# Patient Record
Sex: FEMALE | Race: BLACK OR AFRICAN AMERICAN | NOT HISPANIC OR LATINO
[De-identification: names, ages, dates, MRNs, and addresses within clinical notes are randomized per-mention and may not be internally consistent; named-entity substitution may affect disease eponyms.]

---

## 2017-05-02 ENCOUNTER — OTHER (OUTPATIENT)
Age: 72
End: 2017-05-02

## 2017-08-14 ENCOUNTER — RX RENEWAL (OUTPATIENT)
Age: 72
End: 2017-08-14

## 2017-08-23 ENCOUNTER — OTHER (OUTPATIENT)
Age: 72
End: 2017-08-23

## 2017-09-13 ENCOUNTER — RX RENEWAL (OUTPATIENT)
Age: 72
End: 2017-09-13

## 2017-09-13 ENCOUNTER — LABORATORY RESULT (OUTPATIENT)
Age: 72
End: 2017-09-13

## 2017-09-13 ENCOUNTER — APPOINTMENT (OUTPATIENT)
Dept: INTERNAL MEDICINE | Facility: CLINIC | Age: 72
End: 2017-09-13
Payer: MEDICARE

## 2017-09-13 VITALS
WEIGHT: 212 LBS | TEMPERATURE: 98.2 F | HEIGHT: 66 IN | RESPIRATION RATE: 14 BRPM | HEART RATE: 86 BPM | SYSTOLIC BLOOD PRESSURE: 194 MMHG | OXYGEN SATURATION: 98 % | DIASTOLIC BLOOD PRESSURE: 91 MMHG | BODY MASS INDEX: 34.07 KG/M2

## 2017-09-13 DIAGNOSIS — R94.8 ABNORMAL RESULTS OF FUNCTION STUDIES OF OTHER ORGANS AND SYSTEMS: ICD-10-CM

## 2017-09-13 DIAGNOSIS — M79.609 PAIN IN UNSPECIFIED LIMB: ICD-10-CM

## 2017-09-13 PROCEDURE — G0439: CPT

## 2017-09-13 PROCEDURE — 99214 OFFICE O/P EST MOD 30 MIN: CPT | Mod: 25

## 2017-09-13 PROCEDURE — 36415 COLL VENOUS BLD VENIPUNCTURE: CPT

## 2017-09-19 LAB
25(OH)D3 SERPL-MCNC: 48.9 NG/ML
ALBUMIN SERPL ELPH-MCNC: 4.2 G/DL
ALP BLD-CCNC: 67 U/L
ALT SERPL-CCNC: 42 U/L
ANION GAP SERPL CALC-SCNC: 14 MMOL/L
APPEARANCE: CLEAR
AST SERPL-CCNC: 35 U/L
BASOPHILS # BLD AUTO: 0.02 K/UL
BASOPHILS NFR BLD AUTO: 0.3 %
BILIRUB SERPL-MCNC: 0.3 MG/DL
BILIRUBIN URINE: NEGATIVE
BLOOD URINE: ABNORMAL
BUN SERPL-MCNC: 24 MG/DL
CALCIUM SERPL-MCNC: 9.5 MG/DL
CHLORIDE SERPL-SCNC: 101 MMOL/L
CHOLEST SERPL-MCNC: 207 MG/DL
CHOLEST/HDLC SERPL: 2.6 RATIO
CO2 SERPL-SCNC: 25 MMOL/L
COLOR: YELLOW
CREAT SERPL-MCNC: 0.69 MG/DL
EOSINOPHIL # BLD AUTO: 0.08 K/UL
EOSINOPHIL NFR BLD AUTO: 1.1 %
GLUCOSE QUALITATIVE U: NORMAL MG/DL
GLUCOSE SERPL-MCNC: 128 MG/DL
HBA1C MFR BLD HPLC: 5.8 %
HCT VFR BLD CALC: 47.4 %
HDLC SERPL-MCNC: 79 MG/DL
HGB BLD-MCNC: 15 G/DL
IMM GRANULOCYTES NFR BLD AUTO: 0.3 %
KETONES URINE: NEGATIVE
LDLC SERPL CALC-MCNC: 115 MG/DL
LEUKOCYTE ESTERASE URINE: NEGATIVE
LYMPHOCYTES # BLD AUTO: 1.85 K/UL
LYMPHOCYTES NFR BLD AUTO: 24.6 %
MAN DIFF?: NORMAL
MCHC RBC-ENTMCNC: 30.4 PG
MCHC RBC-ENTMCNC: 31.6 GM/DL
MCV RBC AUTO: 96 FL
MONOCYTES # BLD AUTO: 0.44 K/UL
MONOCYTES NFR BLD AUTO: 5.9 %
NEUTROPHILS # BLD AUTO: 5.11 K/UL
NEUTROPHILS NFR BLD AUTO: 67.8 %
NITRITE URINE: NEGATIVE
PH URINE: 6.5
PLATELET # BLD AUTO: NORMAL
POTASSIUM SERPL-SCNC: 4.1 MMOL/L
PROT SERPL-MCNC: 7.8 G/DL
PROTEIN URINE: ABNORMAL MG/DL
RBC # BLD: 4.94 M/UL
RBC # FLD: 15.4 %
SODIUM SERPL-SCNC: 140 MMOL/L
SPECIFIC GRAVITY URINE: 1.02
TRIGL SERPL-MCNC: 64 MG/DL
TSH SERPL-ACNC: 1.07 UIU/ML
UROBILINOGEN URINE: NORMAL MG/DL
VIT B12 SERPL-MCNC: >2000 PG/ML
WBC # FLD AUTO: 7.52 K/UL

## 2017-10-09 ENCOUNTER — APPOINTMENT (OUTPATIENT)
Dept: INTERNAL MEDICINE | Facility: CLINIC | Age: 72
End: 2017-10-09

## 2017-10-17 ENCOUNTER — OTHER (OUTPATIENT)
Age: 72
End: 2017-10-17

## 2017-10-25 ENCOUNTER — RESULT CHARGE (OUTPATIENT)
Age: 72
End: 2017-10-25

## 2017-10-26 ENCOUNTER — APPOINTMENT (OUTPATIENT)
Dept: HEART AND VASCULAR | Facility: CLINIC | Age: 72
End: 2017-10-26
Payer: MEDICARE

## 2017-10-26 VITALS
OXYGEN SATURATION: 99 % | HEART RATE: 97 BPM | DIASTOLIC BLOOD PRESSURE: 110 MMHG | WEIGHT: 213 LBS | BODY MASS INDEX: 34.23 KG/M2 | TEMPERATURE: 99.2 F | SYSTOLIC BLOOD PRESSURE: 178 MMHG | HEIGHT: 66 IN

## 2017-10-26 DIAGNOSIS — R60.0 LOCALIZED EDEMA: ICD-10-CM

## 2017-10-26 DIAGNOSIS — E78.5 HYPERLIPIDEMIA, UNSPECIFIED: ICD-10-CM

## 2017-10-26 DIAGNOSIS — R80.9 PROTEINURIA, UNSPECIFIED: ICD-10-CM

## 2017-10-26 DIAGNOSIS — Z78.9 OTHER SPECIFIED HEALTH STATUS: ICD-10-CM

## 2017-10-26 PROCEDURE — 99204 OFFICE O/P NEW MOD 45 MIN: CPT | Mod: 25

## 2017-10-26 PROCEDURE — 93000 ELECTROCARDIOGRAM COMPLETE: CPT

## 2017-10-26 RX ORDER — NAPROXEN 250 MG/1
250 TABLET ORAL
Qty: 14 | Refills: 0 | Status: DISCONTINUED | COMMUNITY
Start: 2017-09-13 | End: 2017-10-26

## 2017-11-30 ENCOUNTER — OTHER (OUTPATIENT)
Age: 72
End: 2017-11-30

## 2018-01-16 ENCOUNTER — OTHER (OUTPATIENT)
Age: 73
End: 2018-01-16

## 2018-01-16 ENCOUNTER — RX RENEWAL (OUTPATIENT)
Age: 73
End: 2018-01-16

## 2018-04-19 ENCOUNTER — RX RENEWAL (OUTPATIENT)
Age: 73
End: 2018-04-19

## 2018-07-05 ENCOUNTER — APPOINTMENT (OUTPATIENT)
Dept: ENDOCRINOLOGY | Facility: CLINIC | Age: 73
End: 2018-07-05
Payer: MEDICARE

## 2018-07-05 VITALS
SYSTOLIC BLOOD PRESSURE: 187 MMHG | HEART RATE: 99 BPM | WEIGHT: 220 LBS | DIASTOLIC BLOOD PRESSURE: 93 MMHG | HEIGHT: 66 IN | BODY MASS INDEX: 35.36 KG/M2

## 2018-07-05 DIAGNOSIS — L67.8 OTHER HAIR COLOR AND HAIR SHAFT ABNORMALITIES: ICD-10-CM

## 2018-07-05 DIAGNOSIS — E66.01 MORBID (SEVERE) OBESITY DUE TO EXCESS CALORIES: ICD-10-CM

## 2018-07-05 PROCEDURE — 99204 OFFICE O/P NEW MOD 45 MIN: CPT

## 2018-07-18 ENCOUNTER — OTHER (OUTPATIENT)
Age: 73
End: 2018-07-18

## 2018-07-24 ENCOUNTER — OTHER (OUTPATIENT)
Age: 73
End: 2018-07-24

## 2018-08-02 ENCOUNTER — APPOINTMENT (OUTPATIENT)
Dept: GASTROENTEROLOGY | Facility: CLINIC | Age: 73
End: 2018-08-02
Payer: MEDICARE

## 2018-08-02 VITALS
SYSTOLIC BLOOD PRESSURE: 180 MMHG | RESPIRATION RATE: 16 BRPM | HEART RATE: 97 BPM | DIASTOLIC BLOOD PRESSURE: 98 MMHG | OXYGEN SATURATION: 97 %

## 2018-08-02 DIAGNOSIS — Z86.19 PERSONAL HISTORY OF OTHER INFECTIOUS AND PARASITIC DISEASES: ICD-10-CM

## 2018-08-02 DIAGNOSIS — R10.9 UNSPECIFIED ABDOMINAL PAIN: ICD-10-CM

## 2018-08-02 DIAGNOSIS — Z80.0 FAMILY HISTORY OF MALIGNANT NEOPLASM OF DIGESTIVE ORGANS: ICD-10-CM

## 2018-08-02 PROCEDURE — 99205 OFFICE O/P NEW HI 60 MIN: CPT | Mod: 25

## 2018-08-02 PROCEDURE — 36415 COLL VENOUS BLD VENIPUNCTURE: CPT

## 2018-08-06 LAB — UREA BREATH TEST QL: NEGATIVE

## 2018-08-14 ENCOUNTER — OUTPATIENT (OUTPATIENT)
Dept: OUTPATIENT SERVICES | Facility: HOSPITAL | Age: 73
LOS: 1 days | End: 2018-08-14
Payer: MEDICARE

## 2018-08-14 ENCOUNTER — APPOINTMENT (OUTPATIENT)
Dept: CT IMAGING | Facility: HOSPITAL | Age: 73
End: 2018-08-14

## 2018-08-14 PROCEDURE — 74177 CT ABD & PELVIS W/CONTRAST: CPT | Mod: 26

## 2018-08-14 PROCEDURE — 74177 CT ABD & PELVIS W/CONTRAST: CPT

## 2018-08-16 LAB
ALBUMIN SERPL ELPH-MCNC: 4.4 G/DL
ALP BLD-CCNC: 59 U/L
ALT SERPL-CCNC: 27 U/L
ANION GAP SERPL CALC-SCNC: 15 MMOL/L
AST SERPL-CCNC: 29 U/L
BASOPHILS # BLD AUTO: 0.02 K/UL
BASOPHILS NFR BLD AUTO: 0.3 %
BILIRUB SERPL-MCNC: 0.2 MG/DL
BUN SERPL-MCNC: 24 MG/DL
CALCIUM SERPL-MCNC: 9.7 MG/DL
CHLORIDE SERPL-SCNC: 101 MMOL/L
CO2 SERPL-SCNC: 25 MMOL/L
CREAT SERPL-MCNC: 0.7 MG/DL
EOSINOPHIL # BLD AUTO: 0.06 K/UL
EOSINOPHIL NFR BLD AUTO: 0.8 %
GLUCOSE SERPL-MCNC: 134 MG/DL
HCT VFR BLD CALC: 45.2 %
HGB BLD-MCNC: 14.3 G/DL
IMM GRANULOCYTES NFR BLD AUTO: 0.3 %
LYMPHOCYTES # BLD AUTO: 1.73 K/UL
LYMPHOCYTES NFR BLD AUTO: 23.4 %
MAN DIFF?: NORMAL
MCHC RBC-ENTMCNC: 29.9 PG
MCHC RBC-ENTMCNC: 31.6 GM/DL
MCV RBC AUTO: 94.4 FL
MONOCYTES # BLD AUTO: 0.48 K/UL
MONOCYTES NFR BLD AUTO: 6.5 %
NEUTROPHILS # BLD AUTO: 5.09 K/UL
NEUTROPHILS NFR BLD AUTO: 68.7 %
PLATELET # BLD AUTO: 213 K/UL
POTASSIUM SERPL-SCNC: 4 MMOL/L
PREALB SERPL NEPH-MCNC: 27 MG/DL
PROT SERPL-MCNC: 8.4 G/DL
RBC # BLD: 4.79 M/UL
RBC # FLD: 14.6 %
SODIUM SERPL-SCNC: 141 MMOL/L
WBC # FLD AUTO: 7.4 K/UL

## 2018-08-24 ENCOUNTER — CHART COPY (OUTPATIENT)
Age: 73
End: 2018-08-24

## 2018-08-28 ENCOUNTER — APPOINTMENT (OUTPATIENT)
Dept: INTERNAL MEDICINE | Facility: CLINIC | Age: 73
End: 2018-08-28
Payer: MEDICARE

## 2018-08-28 VITALS — SYSTOLIC BLOOD PRESSURE: 168 MMHG | DIASTOLIC BLOOD PRESSURE: 82 MMHG

## 2018-08-28 VITALS
OXYGEN SATURATION: 96 % | BODY MASS INDEX: 36.32 KG/M2 | DIASTOLIC BLOOD PRESSURE: 82 MMHG | SYSTOLIC BLOOD PRESSURE: 182 MMHG | TEMPERATURE: 99.5 F | HEIGHT: 66 IN | HEART RATE: 85 BPM | WEIGHT: 226 LBS

## 2018-08-28 DIAGNOSIS — R14.0 ABDOMINAL DISTENSION (GASEOUS): ICD-10-CM

## 2018-08-28 DIAGNOSIS — M79.89 OTHER SPECIFIED SOFT TISSUE DISORDERS: ICD-10-CM

## 2018-08-28 PROCEDURE — 99214 OFFICE O/P EST MOD 30 MIN: CPT

## 2018-09-12 ENCOUNTER — FORM ENCOUNTER (OUTPATIENT)
Age: 73
End: 2018-09-12

## 2018-09-13 ENCOUNTER — OUTPATIENT (OUTPATIENT)
Dept: OUTPATIENT SERVICES | Facility: HOSPITAL | Age: 73
LOS: 1 days | End: 2018-09-13
Payer: MEDICARE

## 2018-09-13 DIAGNOSIS — R07.9 CHEST PAIN, UNSPECIFIED: ICD-10-CM

## 2018-09-13 PROCEDURE — 93306 TTE W/DOPPLER COMPLETE: CPT

## 2018-09-13 PROCEDURE — 93306 TTE W/DOPPLER COMPLETE: CPT | Mod: 26

## 2018-09-19 ENCOUNTER — LABORATORY RESULT (OUTPATIENT)
Age: 73
End: 2018-09-19

## 2018-09-19 ENCOUNTER — APPOINTMENT (OUTPATIENT)
Dept: INTERNAL MEDICINE | Facility: CLINIC | Age: 73
End: 2018-09-19
Payer: MEDICARE

## 2018-09-19 ENCOUNTER — RX RENEWAL (OUTPATIENT)
Age: 73
End: 2018-09-19

## 2018-09-19 VITALS
TEMPERATURE: 98.1 F | HEART RATE: 81 BPM | OXYGEN SATURATION: 97 % | BODY MASS INDEX: 36.64 KG/M2 | DIASTOLIC BLOOD PRESSURE: 90 MMHG | HEIGHT: 66 IN | WEIGHT: 228 LBS | SYSTOLIC BLOOD PRESSURE: 170 MMHG

## 2018-09-19 VITALS — DIASTOLIC BLOOD PRESSURE: 88 MMHG | SYSTOLIC BLOOD PRESSURE: 150 MMHG

## 2018-09-19 DIAGNOSIS — I51.9 HEART DISEASE, UNSPECIFIED: ICD-10-CM

## 2018-09-19 DIAGNOSIS — Z00.00 ENCOUNTER FOR GENERAL ADULT MEDICAL EXAMINATION W/OUT ABNORMAL FINDINGS: ICD-10-CM

## 2018-09-19 PROCEDURE — G0439: CPT

## 2018-09-19 PROCEDURE — 36415 COLL VENOUS BLD VENIPUNCTURE: CPT

## 2018-09-25 ENCOUNTER — OTHER (OUTPATIENT)
Age: 73
End: 2018-09-25

## 2018-09-25 LAB
25(OH)D3 SERPL-MCNC: 47.3 NG/ML
ALBUMIN SERPL ELPH-MCNC: 4.4 G/DL
ALP BLD-CCNC: 62 U/L
ALT SERPL-CCNC: 36 U/L
ANION GAP SERPL CALC-SCNC: 13 MMOL/L
APPEARANCE: ABNORMAL
AST SERPL-CCNC: 27 U/L
BASOPHILS # BLD AUTO: 0.03 K/UL
BASOPHILS NFR BLD AUTO: 0.5 %
BILIRUB SERPL-MCNC: 0.2 MG/DL
BILIRUBIN URINE: NEGATIVE
BLOOD URINE: ABNORMAL
BUN SERPL-MCNC: 20 MG/DL
CALCIUM SERPL-MCNC: 9.3 MG/DL
CHLORIDE SERPL-SCNC: 101 MMOL/L
CHOLEST SERPL-MCNC: 183 MG/DL
CHOLEST/HDLC SERPL: 2.8 RATIO
CO2 SERPL-SCNC: 25 MMOL/L
COLOR: YELLOW
CREAT SERPL-MCNC: 0.56 MG/DL
EOSINOPHIL # BLD AUTO: 0.09 K/UL
EOSINOPHIL NFR BLD AUTO: 1.4 %
GLUCOSE QUALITATIVE U: NEGATIVE MG/DL
GLUCOSE SERPL-MCNC: 126 MG/DL
HBA1C MFR BLD HPLC: 6.2 %
HCT VFR BLD CALC: 43.3 %
HDLC SERPL-MCNC: 66 MG/DL
HGB BLD-MCNC: 13.4 G/DL
IMM GRANULOCYTES NFR BLD AUTO: 0.3 %
KETONES URINE: NEGATIVE
LDLC SERPL CALC-MCNC: 107 MG/DL
LEUKOCYTE ESTERASE URINE: ABNORMAL
LYMPHOCYTES # BLD AUTO: 1.8 K/UL
LYMPHOCYTES NFR BLD AUTO: 27.6 %
MAN DIFF?: NORMAL
MCHC RBC-ENTMCNC: 29.5 PG
MCHC RBC-ENTMCNC: 30.9 GM/DL
MCV RBC AUTO: 95.2 FL
MONOCYTES # BLD AUTO: 0.47 K/UL
MONOCYTES NFR BLD AUTO: 7.2 %
NEUTROPHILS # BLD AUTO: 4.12 K/UL
NEUTROPHILS NFR BLD AUTO: 63 %
NITRITE URINE: POSITIVE
PH URINE: 6.5
PLATELET # BLD AUTO: 220 K/UL
POTASSIUM SERPL-SCNC: 4.4 MMOL/L
PROT SERPL-MCNC: 7.7 G/DL
PROTEIN URINE: ABNORMAL MG/DL
RBC # BLD: 4.55 M/UL
RBC # FLD: 16.3 %
SODIUM SERPL-SCNC: 139 MMOL/L
SPECIFIC GRAVITY URINE: 1.02
TRIGL SERPL-MCNC: 49 MG/DL
TSH SERPL-ACNC: 0.81 UIU/ML
UROBILINOGEN URINE: NEGATIVE MG/DL
VIT B12 SERPL-MCNC: >2000 PG/ML
WBC # FLD AUTO: 6.53 K/UL

## 2018-09-25 RX ORDER — NITROFURANTOIN MACROCRYSTALS 100 MG/1
100 CAPSULE ORAL
Qty: 14 | Refills: 0 | Status: ACTIVE | COMMUNITY
Start: 2018-09-25 | End: 1900-01-01

## 2018-11-29 ENCOUNTER — APPOINTMENT (OUTPATIENT)
Dept: HEART AND VASCULAR | Facility: CLINIC | Age: 73
End: 2018-11-29

## 2019-01-18 ENCOUNTER — OTHER (OUTPATIENT)
Age: 74
End: 2019-01-18

## 2019-02-08 ENCOUNTER — INPATIENT (INPATIENT)
Facility: HOSPITAL | Age: 74
LOS: 3 days | Discharge: NURSING, REHAB OR LONG-TERM CARE | DRG: 871 | End: 2019-02-12
Attending: INTERNAL MEDICINE | Admitting: INTERNAL MEDICINE
Payer: MEDICARE

## 2019-02-08 VITALS
DIASTOLIC BLOOD PRESSURE: 80 MMHG | OXYGEN SATURATION: 97 % | RESPIRATION RATE: 21 BRPM | HEART RATE: 86 BPM | TEMPERATURE: 99 F | SYSTOLIC BLOOD PRESSURE: 159 MMHG

## 2019-02-08 DIAGNOSIS — I10 ESSENTIAL (PRIMARY) HYPERTENSION: ICD-10-CM

## 2019-02-08 DIAGNOSIS — N39.0 URINARY TRACT INFECTION, SITE NOT SPECIFIED: ICD-10-CM

## 2019-02-08 DIAGNOSIS — R06.03 ACUTE RESPIRATORY DISTRESS: ICD-10-CM

## 2019-02-08 DIAGNOSIS — A41.9 SEPSIS, UNSPECIFIED ORGANISM: ICD-10-CM

## 2019-02-08 DIAGNOSIS — Z91.89 OTHER SPECIFIED PERSONAL RISK FACTORS, NOT ELSEWHERE CLASSIFIED: ICD-10-CM

## 2019-02-08 DIAGNOSIS — R29.898 OTHER SYMPTOMS AND SIGNS INVOLVING THE MUSCULOSKELETAL SYSTEM: ICD-10-CM

## 2019-02-08 DIAGNOSIS — R73.9 HYPERGLYCEMIA, UNSPECIFIED: ICD-10-CM

## 2019-02-08 DIAGNOSIS — E87.6 HYPOKALEMIA: ICD-10-CM

## 2019-02-08 LAB
ANION GAP SERPL CALC-SCNC: 14 MMOL/L — SIGNIFICANT CHANGE UP (ref 5–17)
ANION GAP SERPL CALC-SCNC: 16 MMOL/L — SIGNIFICANT CHANGE UP (ref 5–17)
APPEARANCE UR: ABNORMAL
BACTERIA # UR AUTO: ABNORMAL /HPF
BASOPHILS # BLD AUTO: 0 K/UL — SIGNIFICANT CHANGE UP (ref 0–0.2)
BASOPHILS NFR BLD AUTO: 0 % — SIGNIFICANT CHANGE UP (ref 0–2)
BILIRUB UR-MCNC: NEGATIVE — SIGNIFICANT CHANGE UP
BUN SERPL-MCNC: 56 MG/DL — HIGH (ref 7–23)
BUN SERPL-MCNC: 59 MG/DL — HIGH (ref 7–23)
CALCIUM SERPL-MCNC: 8.3 MG/DL — LOW (ref 8.4–10.5)
CALCIUM SERPL-MCNC: 8.4 MG/DL — SIGNIFICANT CHANGE UP (ref 8.4–10.5)
CHLORIDE SERPL-SCNC: 96 MMOL/L — SIGNIFICANT CHANGE UP (ref 96–108)
CHLORIDE SERPL-SCNC: 96 MMOL/L — SIGNIFICANT CHANGE UP (ref 96–108)
CK MB CFR SERPL CALC: 2.6 NG/ML — SIGNIFICANT CHANGE UP (ref 0–6.7)
CK SERPL-CCNC: 230 U/L — HIGH (ref 25–170)
CK SERPL-CCNC: 246 U/L — HIGH (ref 25–170)
CO2 SERPL-SCNC: 24 MMOL/L — SIGNIFICANT CHANGE UP (ref 22–31)
CO2 SERPL-SCNC: 24 MMOL/L — SIGNIFICANT CHANGE UP (ref 22–31)
COLOR SPEC: YELLOW — SIGNIFICANT CHANGE UP
CREAT SERPL-MCNC: 1.02 MG/DL — SIGNIFICANT CHANGE UP (ref 0.5–1.3)
CREAT SERPL-MCNC: 1.05 MG/DL — SIGNIFICANT CHANGE UP (ref 0.5–1.3)
CRP SERPL-MCNC: 34 MG/DL — HIGH (ref 0–0.4)
DIFF PNL FLD: ABNORMAL
EOSINOPHIL # BLD AUTO: 0 K/UL — SIGNIFICANT CHANGE UP (ref 0–0.5)
EOSINOPHIL NFR BLD AUTO: 0 % — SIGNIFICANT CHANGE UP (ref 0–6)
EPI CELLS # UR: SIGNIFICANT CHANGE UP /HPF (ref 0–5)
ERYTHROCYTE [SEDIMENTATION RATE] IN BLOOD: 93 MM/HR — HIGH
GAS PNL BLDA: SIGNIFICANT CHANGE UP
GAS PNL BLDV: SIGNIFICANT CHANGE UP
GLUCOSE BLDC GLUCOMTR-MCNC: 321 MG/DL — HIGH (ref 70–99)
GLUCOSE SERPL-MCNC: 291 MG/DL — HIGH (ref 70–99)
GLUCOSE SERPL-MCNC: 345 MG/DL — HIGH (ref 70–99)
GLUCOSE UR QL: NEGATIVE — SIGNIFICANT CHANGE UP
HCT VFR BLD CALC: 35.1 % — SIGNIFICANT CHANGE UP (ref 34.5–45)
HGB BLD-MCNC: 12.1 G/DL — SIGNIFICANT CHANGE UP (ref 11.5–15.5)
KETONES UR-MCNC: NEGATIVE — SIGNIFICANT CHANGE UP
LACTATE SERPL-SCNC: 2.1 MMOL/L — HIGH (ref 0.5–2)
LACTATE SERPL-SCNC: 2.5 MMOL/L — HIGH (ref 0.5–2)
LEUKOCYTE ESTERASE UR-ACNC: ABNORMAL
LG PLATELETS BLD QL AUTO: SLIGHT — SIGNIFICANT CHANGE UP
LYMPHOCYTES # BLD AUTO: 2.45 K/UL — SIGNIFICANT CHANGE UP (ref 1–3.3)
LYMPHOCYTES # BLD AUTO: 7 % — LOW (ref 13–44)
MCHC RBC-ENTMCNC: 29.6 PG — SIGNIFICANT CHANGE UP (ref 27–34)
MCHC RBC-ENTMCNC: 34.5 GM/DL — SIGNIFICANT CHANGE UP (ref 32–36)
MCV RBC AUTO: 85.8 FL — SIGNIFICANT CHANGE UP (ref 80–100)
MONOCYTES # BLD AUTO: 0.7 K/UL — SIGNIFICANT CHANGE UP (ref 0–0.9)
MONOCYTES NFR BLD AUTO: 2 % — SIGNIFICANT CHANGE UP (ref 2–14)
NEUTROPHILS # BLD AUTO: 31.81 K/UL — HIGH (ref 1.8–7.4)
NEUTROPHILS NFR BLD AUTO: 58 % — SIGNIFICANT CHANGE UP (ref 43–77)
NEUTS BAND # BLD: 33 % — HIGH (ref 0–8)
NITRITE UR-MCNC: POSITIVE
NRBC # BLD: 0 /100 — SIGNIFICANT CHANGE UP (ref 0–0)
NRBC # BLD: SIGNIFICANT CHANGE UP /100 WBCS (ref 0–0)
NT-PROBNP SERPL-SCNC: 2658 PG/ML — HIGH (ref 0–300)
PH UR: 6 — SIGNIFICANT CHANGE UP (ref 5–8)
PLAT MORPH BLD: ABNORMAL
PLATELET # BLD AUTO: 112 K/UL — LOW (ref 150–400)
POLYCHROMASIA BLD QL SMEAR: SLIGHT — SIGNIFICANT CHANGE UP
POTASSIUM SERPL-MCNC: 2.7 MMOL/L — CRITICAL LOW (ref 3.5–5.3)
POTASSIUM SERPL-MCNC: 2.9 MMOL/L — CRITICAL LOW (ref 3.5–5.3)
POTASSIUM SERPL-SCNC: 2.7 MMOL/L — CRITICAL LOW (ref 3.5–5.3)
POTASSIUM SERPL-SCNC: 2.9 MMOL/L — CRITICAL LOW (ref 3.5–5.3)
PROT UR-MCNC: 30 MG/DL
RAPID RVP RESULT: SIGNIFICANT CHANGE UP
RBC # BLD: 4.09 M/UL — SIGNIFICANT CHANGE UP (ref 3.8–5.2)
RBC # FLD: 17 % — HIGH (ref 10.3–14.5)
RBC BLD AUTO: ABNORMAL
RBC CASTS # UR COMP ASSIST: < 5 /HPF — SIGNIFICANT CHANGE UP
SCHISTOCYTES BLD QL AUTO: SLIGHT — SIGNIFICANT CHANGE UP
SODIUM SERPL-SCNC: 134 MMOL/L — LOW (ref 135–145)
SODIUM SERPL-SCNC: 136 MMOL/L — SIGNIFICANT CHANGE UP (ref 135–145)
SP GR SPEC: 1.03 — SIGNIFICANT CHANGE UP (ref 1–1.03)
TROPONIN T SERPL-MCNC: 0.01 NG/ML — SIGNIFICANT CHANGE UP (ref 0–0.01)
UROBILINOGEN FLD QL: 0.2 E.U./DL — SIGNIFICANT CHANGE UP
WBC # BLD: 34.96 K/UL — HIGH (ref 3.8–10.5)
WBC # FLD AUTO: 34.96 K/UL — HIGH (ref 3.8–10.5)
WBC UR QL: ABNORMAL /HPF

## 2019-02-08 PROCEDURE — 99223 1ST HOSP IP/OBS HIGH 75: CPT | Mod: GC

## 2019-02-08 PROCEDURE — 71045 X-RAY EXAM CHEST 1 VIEW: CPT | Mod: 26

## 2019-02-08 PROCEDURE — 72148 MRI LUMBAR SPINE W/O DYE: CPT | Mod: 26

## 2019-02-08 PROCEDURE — 72100 X-RAY EXAM L-S SPINE 2/3 VWS: CPT | Mod: 26

## 2019-02-08 PROCEDURE — 99291 CRITICAL CARE FIRST HOUR: CPT

## 2019-02-08 RX ORDER — GLUCAGON INJECTION, SOLUTION 0.5 MG/.1ML
1 INJECTION, SOLUTION SUBCUTANEOUS ONCE
Qty: 0 | Refills: 0 | Status: DISCONTINUED | OUTPATIENT
Start: 2019-02-08 | End: 2019-02-12

## 2019-02-08 RX ORDER — POTASSIUM CHLORIDE 20 MEQ
10 PACKET (EA) ORAL
Qty: 0 | Refills: 0 | Status: COMPLETED | OUTPATIENT
Start: 2019-02-08 | End: 2019-02-08

## 2019-02-08 RX ORDER — INSULIN LISPRO 100/ML
VIAL (ML) SUBCUTANEOUS
Qty: 0 | Refills: 0 | Status: DISCONTINUED | OUTPATIENT
Start: 2019-02-08 | End: 2019-02-12

## 2019-02-08 RX ORDER — CEFTRIAXONE 500 MG/1
1 INJECTION, POWDER, FOR SOLUTION INTRAMUSCULAR; INTRAVENOUS ONCE
Qty: 0 | Refills: 0 | Status: COMPLETED | OUTPATIENT
Start: 2019-02-08 | End: 2019-02-09

## 2019-02-08 RX ORDER — DEXTROSE 50 % IN WATER 50 %
15 SYRINGE (ML) INTRAVENOUS ONCE
Qty: 0 | Refills: 0 | Status: DISCONTINUED | OUTPATIENT
Start: 2019-02-08 | End: 2019-02-12

## 2019-02-08 RX ORDER — SODIUM CHLORIDE 9 MG/ML
1000 INJECTION, SOLUTION INTRAVENOUS
Qty: 0 | Refills: 0 | Status: DISCONTINUED | OUTPATIENT
Start: 2019-02-08 | End: 2019-02-12

## 2019-02-08 RX ORDER — POTASSIUM CHLORIDE 20 MEQ
40 PACKET (EA) ORAL ONCE
Qty: 0 | Refills: 0 | Status: COMPLETED | OUTPATIENT
Start: 2019-02-08 | End: 2019-02-08

## 2019-02-08 RX ORDER — DEXTROSE 50 % IN WATER 50 %
25 SYRINGE (ML) INTRAVENOUS ONCE
Qty: 0 | Refills: 0 | Status: DISCONTINUED | OUTPATIENT
Start: 2019-02-08 | End: 2019-02-12

## 2019-02-08 RX ORDER — DEXTROSE 50 % IN WATER 50 %
12.5 SYRINGE (ML) INTRAVENOUS ONCE
Qty: 0 | Refills: 0 | Status: DISCONTINUED | OUTPATIENT
Start: 2019-02-08 | End: 2019-02-12

## 2019-02-08 RX ORDER — PANTOPRAZOLE SODIUM 20 MG/1
40 TABLET, DELAYED RELEASE ORAL
Qty: 0 | Refills: 0 | Status: DISCONTINUED | OUTPATIENT
Start: 2019-02-08 | End: 2019-02-12

## 2019-02-08 RX ORDER — INFLUENZA VIRUS VACCINE 15; 15; 15; 15 UG/.5ML; UG/.5ML; UG/.5ML; UG/.5ML
0.5 SUSPENSION INTRAMUSCULAR ONCE
Qty: 0 | Refills: 0 | Status: COMPLETED | OUTPATIENT
Start: 2019-02-08 | End: 2019-02-08

## 2019-02-08 RX ORDER — MAGNESIUM SULFATE 500 MG/ML
2 VIAL (ML) INJECTION ONCE
Qty: 0 | Refills: 0 | Status: COMPLETED | OUTPATIENT
Start: 2019-02-08 | End: 2019-02-08

## 2019-02-08 RX ORDER — AZITHROMYCIN 500 MG/1
500 TABLET, FILM COATED ORAL ONCE
Qty: 0 | Refills: 0 | Status: COMPLETED | OUTPATIENT
Start: 2019-02-08 | End: 2019-02-08

## 2019-02-08 RX ORDER — ACETAMINOPHEN 500 MG
975 TABLET ORAL ONCE
Qty: 0 | Refills: 0 | Status: COMPLETED | OUTPATIENT
Start: 2019-02-08 | End: 2019-02-08

## 2019-02-08 RX ORDER — SODIUM CHLORIDE 9 MG/ML
1000 INJECTION INTRAMUSCULAR; INTRAVENOUS; SUBCUTANEOUS ONCE
Qty: 0 | Refills: 0 | Status: COMPLETED | OUTPATIENT
Start: 2019-02-08 | End: 2019-02-08

## 2019-02-08 RX ORDER — CEFTRIAXONE 500 MG/1
1 INJECTION, POWDER, FOR SOLUTION INTRAMUSCULAR; INTRAVENOUS EVERY 24 HOURS
Qty: 0 | Refills: 0 | Status: DISCONTINUED | OUTPATIENT
Start: 2019-02-09 | End: 2019-02-10

## 2019-02-08 RX ORDER — AZITHROMYCIN 500 MG/1
250 TABLET, FILM COATED ORAL DAILY
Qty: 0 | Refills: 0 | Status: DISCONTINUED | OUTPATIENT
Start: 2019-02-09 | End: 2019-02-09

## 2019-02-08 RX ORDER — HEPARIN SODIUM 5000 [USP'U]/ML
5000 INJECTION INTRAVENOUS; SUBCUTANEOUS EVERY 8 HOURS
Qty: 0 | Refills: 0 | Status: DISCONTINUED | OUTPATIENT
Start: 2019-02-08 | End: 2019-02-12

## 2019-02-08 RX ORDER — IPRATROPIUM/ALBUTEROL SULFATE 18-103MCG
3 AEROSOL WITH ADAPTER (GRAM) INHALATION EVERY 6 HOURS
Qty: 0 | Refills: 0 | Status: DISCONTINUED | OUTPATIENT
Start: 2019-02-08 | End: 2019-02-09

## 2019-02-08 RX ORDER — SODIUM CHLORIDE 9 MG/ML
500 INJECTION INTRAMUSCULAR; INTRAVENOUS; SUBCUTANEOUS ONCE
Qty: 0 | Refills: 0 | Status: COMPLETED | OUTPATIENT
Start: 2019-02-08 | End: 2019-02-08

## 2019-02-08 RX ADMIN — Medication 100 MILLIEQUIVALENT(S): at 22:35

## 2019-02-08 RX ADMIN — AZITHROMYCIN 255 MILLIGRAM(S): 500 TABLET, FILM COATED ORAL at 20:00

## 2019-02-08 RX ADMIN — Medication 50 GRAM(S): at 19:17

## 2019-02-08 RX ADMIN — SODIUM CHLORIDE 2000 MILLILITER(S): 9 INJECTION INTRAMUSCULAR; INTRAVENOUS; SUBCUTANEOUS at 22:27

## 2019-02-08 RX ADMIN — Medication 3 MILLILITER(S): at 18:40

## 2019-02-08 RX ADMIN — CEFTRIAXONE 100 GRAM(S): 500 INJECTION, POWDER, FOR SOLUTION INTRAMUSCULAR; INTRAVENOUS at 19:10

## 2019-02-08 RX ADMIN — Medication 100 MILLIEQUIVALENT(S): at 22:28

## 2019-02-08 RX ADMIN — PANTOPRAZOLE SODIUM 40 MILLIGRAM(S): 20 TABLET, DELAYED RELEASE ORAL at 22:49

## 2019-02-08 RX ADMIN — SODIUM CHLORIDE 500 MILLILITER(S): 9 INJECTION INTRAMUSCULAR; INTRAVENOUS; SUBCUTANEOUS at 23:43

## 2019-02-08 RX ADMIN — Medication 40 MILLIEQUIVALENT(S): at 18:10

## 2019-02-08 RX ADMIN — Medication 8: at 22:45

## 2019-02-08 RX ADMIN — HEPARIN SODIUM 5000 UNIT(S): 5000 INJECTION INTRAVENOUS; SUBCUTANEOUS at 22:45

## 2019-02-08 RX ADMIN — Medication 3 MILLILITER(S): at 22:35

## 2019-02-08 RX ADMIN — Medication 975 MILLIGRAM(S): at 19:08

## 2019-02-08 RX ADMIN — Medication 100 MILLIEQUIVALENT(S): at 23:39

## 2019-02-08 RX ADMIN — Medication 125 MILLIGRAM(S): at 19:12

## 2019-02-08 NOTE — ED PROVIDER NOTE - PLAN OF CARE
improved weakness repleting potassium and infectious workup. neuro reccs pending. no cord compression on MRI.

## 2019-02-08 NOTE — ED PROVIDER NOTE - SECONDARY DIAGNOSIS.
Pneumonia of both lungs due to infectious organism, unspecified part of lung Severe persistent reactive airway disease with acute exacerbation

## 2019-02-08 NOTE — CONSULT NOTE ADULT - ASSESSMENT
IMPRESSION & RECOMMENDATIONS:  73 F PMH lumbar herniated disc, ?venous stasis, HTN, allergic sinusitis, knee OA b/l, presenting from home for progressive LE weakness.   -obtain TSH  -EMG    INCOMPLETE IMPRESSION & RECOMMENDATIONS:    73 F PMH lumbar herniated disc, knee OA b/l, presenting from home for progressive R>L LE weakness. Weakness is b/l and chronic, differential includes metabolic /endocrine (pt reports hypothyroidism and has mildly elevated CK) vs myopathy (ie autoimmune) vs peripheral neuropathy, less likely central process as reflexes are diminished vs malignancy.  Her acute worsening this past week may be 2/2 infectious/inflammatory process (WBC 34) vs metabolic derangement (K 2.7.)    -XR Retrolisthesis of L5 on S1. No evidence of acute fractures.  -MR lumbar spine pending, f/u read  -obtain TSH, ESR, aldolase  -obtain and replete electrolytes  -obtain differential on CBC IMPRESSION & RECOMMENDATIONS:    73 F PMH lumbar herniated disc, knee OA b/l, presenting from home for progressive R>L LE weakness. Weakness is b/l and chronic, differential includes metabolic /endocrine (pt reports hypothyroidism and has mildly elevated CK) vs myopathy (ie autoimmune) vs peripheral neuropathy, less likely central process as reflexes are diminished vs malignancy.  Her acute worsening this past week may be 2/2 infectious/inflammatory process (WBC 34) vs metabolic derangement (K 2.7.)    -XR Retrolisthesis of L5 on S1. No evidence of acute fractures.  -MR lumbar spine pending, f/u read  -Would also obtain MR cervical and thoracic to r/o cord compression in setting of UE hyperreflexia, though low suspicion  -obtain TSH, ESR, aldolase  -obtain and replete electrolytes  -obtain differential on CBC

## 2019-02-08 NOTE — ED ADULT TRIAGE NOTE - CHIEF COMPLAINT QUOTE
"I was in a bathroom and I wasn't able to get up because I had no strength on my legs. I've been feeling like this for weeks."

## 2019-02-08 NOTE — ED ADULT NURSE NOTE - OBJECTIVE STATEMENT
Pt presents with hx of feeling too weak to get up from the toilet this am. Pt reports she asked her  to place pillows on the floor in front of the toilet so that she could purposefully fall onto her knees from the toilet. Pt reports no head strike, no LOC. Pt reports she has recent hx of feeling weak over the past month.

## 2019-02-08 NOTE — H&P ADULT - PROBLEM SELECTOR PLAN 8
1) PCP Contacted on Admission: (Y/N) --> Name & Phone #: Dr. Garcia   2) Date of Contact with PCP:  3) PCP Contacted at Discharge: (Y/N, N/A)  4) Summary of Handoff Given to PCP:   5) Post-Discharge Appointment Date and Location: chronic; encourage weight loss, pain medications prn; followup other MRI spine studies per neuro recs

## 2019-02-08 NOTE — ED PROVIDER NOTE - PMH
Essential hypertension    Obesity, unspecified classification, unspecified obesity type, unspecified whether serious comorbidity present

## 2019-02-08 NOTE — H&P ADULT - PROBLEM SELECTOR PROBLEM 8
Transition of care performed with sharing of clinical summary Chronic bilateral low back pain, with sciatica presence unspecified

## 2019-02-08 NOTE — ED PROVIDER NOTE - MEDICAL DECISION MAKING DETAILS
73F with HTN, morbid obesity, ?hypothyroidism, herniated disc presenting with LE weakness. Mild associated LBP, at baseline. VSS. PE notable for diminished LE strength- 3/5 RLE and 4/5 LLE with hypoactive patellar reflexes. Normal rectal tone without groin anesthesia or incontinence. Workup including BMP, CPK, UA, lumbar XR and lumbar MRI. Neuro consulted. awaiting reccs. differential includes cord compression, deconditioning, electrolyte disturbance. 73F with HTN, morbid obesity, ?hypothyroidism, herniated disc presenting with LE weakness. Mild associated LBP, at baseline. VSS. PE notable for diminished LE strength- 3/5 RLE and 4/5 LLE with hypoactive patellar reflexes. Normal rectal tone without groin anesthesia or incontinence. Workup including BMP, CPK, UA, lumbar XR and lumbar MRI. Neuro consulted. awaiting reccs. differential includes disc dz, cord compression, deconditioning, electrolyte disturbance. 73F with HTN, morbid obesity, ?hypothyroidism, herniated disc presenting with LE weakness. Mild associated LBP, at baseline. VSS. PE notable for diminished LE strength- 3/5 RLE and 4/5 LLE with hypoactive patellar reflexes. Normal rectal tone without groin anesthesia or incontinence. CBC shows leukocytosis 34k. Diff pending. BMP remarkable for K 2.7. Repleting with 40 PO x 2. Lumbar MRI shows no cord compression. Will need PT eval and infectious workup, possible malignancy vs rheum w/u. Admit to medicine.

## 2019-02-08 NOTE — ED PROVIDER NOTE - PHYSICAL EXAMINATION
General:  NAD, nontoxic appearing, morbidly obese  HENT:  EOMI, PERRL.  No sinus tenderness.  oropharynx WNL.  MMM  Neck:  Trachea midline.  No JVD, LAD, or thyromegaly.  Heart:  S1S2 no M/R/G, rrr  Lungs:  CTAB no wheezing, rhonchi or rales.  No accessory muscle use.  No respiratory distress.  Abdomen:  NABS.  soft, nontender, nondistended.  no guarding.  no ascites.  no organomegaly.  Vascular:  Peripheral pulses palpable  Extremities:  2+ b/l LE edema without calf pain  Back:  No CVA tenderness. mild midline lumbar lower ttp. no deformity. full AROM. normal rectal tone.   Neuro:  AOx3, no facial asymmetry, no slurred speech. 3/5 RLE. 4/5 LLE. 1+ patellar reflexes. gait deferred.   Skin:  No rash

## 2019-02-08 NOTE — H&P ADULT - PROBLEM SELECTOR PLAN 4
Patient w/ potassium of 2.7 upon admission.   -S/p 40meq PO x2 in the ED   -Repeat BMP w/ K of 2.9. Patient to be given 10meq x3 Patient presented w/ b/l LE weakness and difficulty standing from commode this morning. Patient w/ chronic lower back pain for which MRI was negative for acute signs of cord compression and only notable for disc protrusion in L3-L4, L4-L5, L5-S1. Xray lumbar spine normal. Etiology likely in the setting of infection vs. electrolyte/metabolic given pt w/ hypokalemia and elevated CK of 246 vs. myopathy.   -Neurology following --appreciate recs   -Pending MRI Cervical and Thoracic spine

## 2019-02-08 NOTE — H&P ADULT - PROBLEM SELECTOR PLAN 5
Patient w/ history of HTN but unsure of which medications she takes. As per outpatient records patient on Norvasc, HCTZ and Atenolol at home.   -Will hold for now pending additional medication reconciliation elevate glucose, in setting of IV steroids, pt denies hx of DM. ISS while on steroids, monitor glucose elevate glucose, in setting of IV steroids w/ infection, pt denies hx of DM. ISS while on steroids, monitor glucose

## 2019-02-08 NOTE — ED PROVIDER NOTE - CRITICAL CARE PROVIDED
additional history taking/consultation with other physicians/documentation/direct patient care (not related to procedure)

## 2019-02-08 NOTE — H&P ADULT - HISTORY OF PRESENT ILLNESS
72 y/o F w/ PMHx notable for lumbar herniated discs, HTN, allergic sinusitis (takes claritin), b/l knee OA who initially presented from home w/ b/l LE weakness. Patient reports that this morning she woke up at around 4am and while in the bathroom patient reports that she was unable to stand from toilet. Patient reported that she was stuck on the commode for at least 8 hours before her  called EMS. Patient denies any associated chest pain, shortness of breath, cough, nausea, vomiting, fever/chills, dysuria, numbness, urinary incontinence, numbness/tingling.     Upon arrival pt w/ T: 98.8, HR: 86, BP: 159/80, RR:21, O2: 97% on room air.     Labs notable for WBC of 34.96 with 33% bands, K 2.7, Cr 1.05, TSH 0.309,   Patient was evaluated by neurology and had Lumbar MRI which was negative for any signs of cord compression.     When patient was evaluated at bedside she was noted to have significant audible wheezing and difficulty speaking in full sentences. Patient denied any history of asthma, copd or tobacco use. No inhaler use at home. 74 y/o F w/ PMHx notable for lumbar herniated discs, HTN, allergic sinusitis (takes claritin), b/l knee OA who initially presented from home w/ b/l LE weakness. Patient reports that this morning she woke up at around 4am and while in the bathroom patient reports that she was unable to stand from toilet. Patient reported that she was stuck on the commode for at least 8 hours before her  called EMS. Patient denies any associated chest pain, palpitations, headache, dizziness, shortness of breath, cough, nausea, vomiting, fever/chills, dysuria, numbness, urinary incontinence, numbness/tingling. No loss of consciousness no recent travel or sick contacts.     Upon arrival pt w/ T: 98.8, HR: 86, BP: 159/80, RR:21, O2: 97% on room air.   Labs notable for WBC of 34.96 with 33% bands, K 2.7, Cr 1.05, TSH 0.309,   Patient was evaluated by neurology and had Lumbar MRI which was negative for any signs of cord compression.   Patient was given Potassium 40meq x2    When patient was evaluated at bedside she was noted to have significant audible wheezing and difficulty speaking in full sentences. Patient denied any history of asthma, copd or tobacco use. No inhaler use at home. Patient was given Duonebs x3, Solumedrol 125mg, Mg 2g.   Repeat vitals notable for Tmax 103.2 (rectal), HR 86, /64, RR: 32 O2: 96% on 8L via aerosol mask.   Blood cultures, RVP, ABG obtained and pt given Tylenol 975, 1L NS, Cef + Azithromycin.

## 2019-02-08 NOTE — ED ADULT NURSE NOTE - NSIMPLEMENTINTERV_GEN_ALL_ED
Implemented All Universal Safety Interventions:  [place] to call system. Call bell, personal items and telephone within reach. Instruct patient to call for assistance. Room bathroom lighting operational. Non-slip footwear when patient is off stretcher. Physically safe environment: no spills, clutter or unnecessary equipment. Stretcher in lowest position, wheels locked, appropriate side rails in place.

## 2019-02-08 NOTE — ED PROVIDER NOTE - OBJECTIVE STATEMENT
Pt is a 73F with a hx of lumbar herniated disc, ?venous stasis, HTN, allergic sinusitis, knee OA b/l, presenting from home for LE weakness. She has had progressive LE weakness b/l for several months. This morning, she was unable to stand up from the toilet. She was stuck on the commode for 8 hours before a home  called EMS. LBP is at baseline, 1/10 currently, midline, nonradiating LBP. No LE numbness/tingling. Denies incontinence, groin anesthesia. Denies back trauma. Denies F/C. Has mild UE weakness as well b/l. Denies HA, neck stiffness, CP, palp, sob, cough, abd pain, N/V/D/C. Of note, she takes an OTC herbal supplement for hypothyroidism reportedly diagnosed by a ?homeopath. Denies hair/skin changes, night sweats, blood in the stool. Last mammo normal 3 years ago. Never had a c scope.     ROS otherwise neg.

## 2019-02-08 NOTE — H&P ADULT - ASSESSMENT
74 y/o F w/ PMHx notable for lumbar herniated discs, HTN, allergic sinusitis (takes claritin), b/l knee OA who initially presented from home w/ b/l LE weakness but found to be in respiratory distress likely in the setting of an infectious etiology.

## 2019-02-08 NOTE — H&P ADULT - PROBLEM SELECTOR PLAN 2
Patient w/ acute change in respiratory status with audible wheezing that improved s/p Duonebs x3, Solumedrol 125mg x1 and Mg 2g. No reported hx of Asthma, COPD or Tobacco use. Etiology of reactive airway likely secondary to infectious lung process vs. acute CHF given pt w/ elevated BNP, b/l LE edema and BNP >2000  -ABG consistent with respiratory alkalosis   -Duonebs q6hrs standing   -Will continue patient on Prednisone 40mg daily for 5 more days   -C/w Nasal canula and will keep BIPAP at bedside   -F/u Echocardiogram

## 2019-02-08 NOTE — H&P ADULT - ATTENDING COMMENTS
patient seen and examined acutely in ED holding on evening of 2/8/19 and re-evaluated several times overnight.     pt reports on further questioning lower abdominal pain for 1 week, has a hx of urinary incontinence from weak bladder; pt sat on the toilet on day of admission and was too weak to get up. Pt had a person cleaning her house at the same time and had person call EMS; pt began to have respiratory sxs on way here, culminating in acute respiratory distress in the ED. Pt initially was being evaluated for cord compression 2/2 lower ext weakness but found during times of acute respiratory sxs to have severe sepsis.     reviewed data including:  labs, available radiological reports/ studies, ekg     agree w/ PE findings as above w/ additions/ exceptions/ pertinent findings: awake, alert, tired appearing ; MMM; s1s2, decreased breath sounds at Right base abdomen obese, soft/nt/nd; +2 lower extremity pitting edema b/l ; +1/5 motor strength b/l, pt unable to lift her lower ext , negative SLR test b/l     1. severe sepsis from UTI w/ episode of respiratory distress, etiology of respiratory sxs unclear, possible CAP. RVP was negative;  pt's respiratory sxs improved s/p Duonebs, and IV solumedrol, started on prednisone. cautiously given IV Fluid bolus (1.5L ) in setting of unknown EF; started on azithromycin and ceftriaxone to treat both known UTI (+UA w/ sxs) and possible CAP, followup ctxs, trend lactate. repeat AM CXR.  Followup ECHO ; holding BP medications    2. lower ext weakness: no cord compression on MRI LS, appreciate neuro recs, pt w/ long standing lower back pain, lower ext weakness appears related to severe sepsis/ UTI. ordered MRI C and T-spines per neuro recs prior to episode of respitory distress which revealed pt to have severe sepsis. followup neuro recs and MRI studies. will need PT evaluation as well.       rest of plan as above; coordinated care extensively w/ nursing and house staff

## 2019-02-08 NOTE — CONSULT NOTE ADULT - SUBJECTIVE AND OBJECTIVE BOX
NEUROLOGY INITIAL CONSULT NOTE    CHIEF COMPLAINT:      HPI:  73 F PMH lumbar herniated disc, ?venous stasis, HTN, allergic sinusitis, knee OA b/l, presenting from home for LE weakness. She has had progressive LE weakness b/l for several months. This morning, she was unable to stand up from the toilet. She was stuck on the commode for 8 hours before  called EMS. Denies incontinence, groin anesthesia, paresthesias, trauma, F/C. Reports mild UE weakness as well.  Chronic back pain 1/10, midline, nonradiating. Denies paresthesias. ?H/o hypothyroidism.    PAST MEDICAL & SURGICAL HISTORY:  Essential hypertension  Obesity, unspecified classification, unspecified obesity type, unspecified whether serious comorbidity present  No significant past surgical history    REVIEW OF SYSTEMS:  As per HPI, otherwise negative for Constitutional, Eyes, Ears/Nose/Mouth/Throat, Neck, Cardiovascular, Respiratory, Gastrointestinal, Genitourinary, Skin, Endocrine, Musculoskeletal, Psychiatric, and Hematologic/Lymphatic.    MEDICATIONS  (STANDING):    MEDICATIONS  (PRN):    Allergies    No Known Allergies    Intolerances    FAMILY HISTORY:  Family history of obesity (Mother)    VITAL SIGNS:  Vital Signs Last 24 Hrs  T(C): 37.1 (08 Feb 2019 13:43), Max: 37.1 (08 Feb 2019 13:43)  T(F): 98.8 (08 Feb 2019 13:43), Max: 98.8 (08 Feb 2019 13:43)  HR: 86 (08 Feb 2019 13:43) (86 - 86)  BP: 159/80 (08 Feb 2019 13:43) (159/80 - 159/80)  BP(mean): --  RR: 21 (08 Feb 2019 13:43) (21 - 21)  SpO2: 97% (08 Feb 2019 13:43) (97% - 97%)    PHYSICAL EXAMINATION:  Constitutional: obese; NAD  Eyes: Conjunctiva and sclera clear.  Cardiovascular: Regular rate and rhythm; S1 and S2 Normal; No murmurs, gallops or rubs.  Neurologic:  - Mental Status:  AAOx3; speech is fluent with intact naming, repetition, and comprehension; immediate recall is 3/3 words and delayed recall is 3/3 words at 5 minutes; able to spell WORLD backwards; Good overall fund of knowledge.  - Cranial Nerves II-XII:    II:  Visual fields are full to confrontation; Pupils are equal, round, and reactive to light.  III, IV, VI:  Extraocular movements are intact without nystagmus.  V:  Facial sensation is intact in the V1-V3 distribution bilaterally.  VII:  Face is symmetric with normal eye closure and smile  VIII:  Hearing is intact to finger rub.  IX, X:  Uvula is midline and soft palate rises symmetrically  XI:  Head turning and shoulder shrug are intact.  XII:  Tongue protrudes in the midline.  - Motor:  Strength is 4/5 throughout.  Normal muscle bulk and tone throughout.  - Reflexes:  2+ and symmetric at the biceps, triceps, brachioradialis, knees, and ankles.  Plantar responses flexor.  - Sensory:  Intact to light touch  - Coordination:  Finger-nose-finger and heel-knee-shin intact without dysmetria.  Rapid alternating hand movements intact.  - Gait:  Deferred    LABS:  pending      RADIOLOGY & ADDITIONAL STUDIES:  reviewed NEUROLOGY INITIAL CONSULT NOTE    CHIEF COMPLAINT:      HPI:  73 F PMH lumbar herniated disc, ?venous stasis, HTN, allergic sinusitis, knee OA b/l, presenting from home for LE weakness R>L. She has had progressive LE weakness b/l for one year, which worsened more rapidly over the past 1 week. This morning, she was unable to stand up from the toilet. She was stuck on the commode for 8 hours before  called EMS. Denies incontinence, groin anesthesia, paresthesias, trauma, F/C. Reports mild UE weakness as well.  Chronic back pain 1/10, midline, nonradiating. ?H/o hypothyroidism. ED provider reports normal rectal tone.    PAST MEDICAL & SURGICAL HISTORY:  Essential hypertension  Obesity, unspecified classification, unspecified obesity type, unspecified whether serious comorbidity present  No significant past surgical history    REVIEW OF SYSTEMS:  As per HPI, otherwise negative for Constitutional, Eyes, Ears/Nose/Mouth/Throat, Neck, Cardiovascular, Respiratory, Gastrointestinal, Genitourinary, Skin, Endocrine, Musculoskeletal, Psychiatric, and Hematologic/Lymphatic.    MEDICATIONS  (STANDING):    MEDICATIONS  (PRN):    Allergies    No Known Allergies    Intolerances    FAMILY HISTORY:  Family history of obesity (Mother)    VITAL SIGNS:  Vital Signs Last 24 Hrs  T(C): 37.1 (08 Feb 2019 13:43), Max: 37.1 (08 Feb 2019 13:43)  T(F): 98.8 (08 Feb 2019 13:43), Max: 98.8 (08 Feb 2019 13:43)  HR: 86 (08 Feb 2019 13:43) (86 - 86)  BP: 159/80 (08 Feb 2019 13:43) (159/80 - 159/80)  BP(mean): --  RR: 21 (08 Feb 2019 13:43) (21 - 21)  SpO2: 97% (08 Feb 2019 13:43) (97% - 97%)    PHYSICAL EXAMINATION:  Constitutional: obese; NAD  Eyes: Conjunctiva and sclera clear.  Cardiovascular: Regular rate and rhythm; S1 and S2 Normal; No murmurs, gallops or rubs.  Neurologic:  - Mental Status:  AAOx3; speech is fluent with intact naming, repetition, and comprehension; Good overall fund of knowledge.  - Cranial Nerves II-XII:    II:  Visual fields are full to confrontation; Pupils are equal, round, and reactive to light.  III, IV, VI:  Extraocular movements are intact without nystagmus.  V:  Facial sensation is intact in the V1-V3 distribution bilaterally.  VII:  Face is symmetric with normal eye closure and smile  VIII:  Hearing is intact to finger rub.  IX, X:  Uvula is midline and soft palate rises symmetrically  XI:  Head turning and shoulder shrug are intact.  XII:  Tongue protrudes in the midline.  - Motor:  Strength is 3/5 b/l LE.  Normal muscle bulk and tone throughout.  - Reflexes:  1+ and symmetric at the biceps, triceps, brachioradialis, knees, and ankles.  Babinski mute.  - Sensory:  Intact to light touch  - Coordination:  Finger-nose-finger and heel-knee-shin intact without dysmetria.  Rapid alternating hand movements intact.  - Gait:  Deferred    LABS:  pending    RADIOLOGY & ADDITIONAL STUDIES:  reviewed NEUROLOGY INITIAL CONSULT NOTE    CHIEF COMPLAINT:      HPI:  73 F PMH lumbar herniated disc, ?venous stasis, HTN, allergic sinusitis, knee OA b/l, presenting from home for LE weakness R>L. She has had progressive LE weakness b/l for one year, which worsened more rapidly over the past 1 week. This morning, she was unable to stand up from the toilet. She was stuck on the commode for 8 hours before  called EMS. Denies incontinence, groin anesthesia, paresthesias, trauma, F/C. Reports mild UE weakness as well.  Chronic back pain 1/10, midline, nonradiating. ?H/o hypothyroidism. ED provider reports normal rectal tone.    PAST MEDICAL & SURGICAL HISTORY:  Essential hypertension  Obesity, unspecified classification, unspecified obesity type, unspecified whether serious comorbidity present  No significant past surgical history    REVIEW OF SYSTEMS:  As per HPI, otherwise negative for Constitutional, Eyes, Ears/Nose/Mouth/Throat, Neck, Cardiovascular, Respiratory, Gastrointestinal, Genitourinary, Skin, Endocrine, Musculoskeletal, Psychiatric, and Hematologic/Lymphatic.    MEDICATIONS  (STANDING):    MEDICATIONS  (PRN):    Allergies    No Known Allergies    Intolerances    FAMILY HISTORY:  Family history of obesity (Mother)    VITAL SIGNS:  Vital Signs Last 24 Hrs  T(C): 37.1 (08 Feb 2019 13:43), Max: 37.1 (08 Feb 2019 13:43)  T(F): 98.8 (08 Feb 2019 13:43), Max: 98.8 (08 Feb 2019 13:43)  HR: 86 (08 Feb 2019 13:43) (86 - 86)  BP: 159/80 (08 Feb 2019 13:43) (159/80 - 159/80)  BP(mean): --  RR: 21 (08 Feb 2019 13:43) (21 - 21)  SpO2: 97% (08 Feb 2019 13:43) (97% - 97%)    PHYSICAL EXAMINATION:  Constitutional: obese; NAD  Eyes: Conjunctiva and sclera clear.  Cardiovascular: Regular rate and rhythm; S1 and S2 Normal; No murmurs, gallops or rubs.  Neurologic:  - Mental Status:  AAOx3; speech is fluent with intact naming, repetition, and comprehension; Good overall fund of knowledge.  - Cranial Nerves II-XII:    II:  Visual fields are full to confrontation; Pupils are equal, round, and reactive to light.  III, IV, VI:  Extraocular movements are intact without nystagmus.  V:  Facial sensation is intact in the V1-V3 distribution bilaterally.  VII:  Face is symmetric with normal eye closure and smile  VIII:  Hearing is intact to finger rub.  IX, X:  Uvula is midline and soft palate rises symmetrically  XI:  Head turning and shoulder shrug are intact.  XII:  Tongue protrudes in the midline.  - Motor:  Strength is 3/5 b/l LE.  Normal muscle bulk and tone throughout.  - Reflexes:  3+ and symmetric at the biceps, triceps, brachioradialis, 1+ knees, and ankles.  Babinski downgoing.  - Sensory:  Intact to light touch  - Coordination:  Finger-nose-finger and heel-knee-shin intact without dysmetria.  Rapid alternating hand movements intact.  - Gait:  Deferred    LABS:  pending    RADIOLOGY & ADDITIONAL STUDIES:  reviewed

## 2019-02-08 NOTE — H&P ADULT - PROBLEM SELECTOR PLAN 6
1) PCP Contacted on Admission: (Y/N) --> Name & Phone #: Dr. Garcia   2) Date of Contact with PCP:  3) PCP Contacted at Discharge: (Y/N, N/A)  4) Summary of Handoff Given to PCP:   5) Post-Discharge Appointment Date and Location: Patient w/ potassium of 2.7 upon admission.   -S/p 40meq PO x2 in the ED   -Repeat BMP w/ K of 2.9. Patient to be given 10meq x3

## 2019-02-08 NOTE — H&P ADULT - NSHPPHYSICALEXAM_GEN_ALL_CORE
.  VITAL SIGNS:  T(C): 39.6 (02-08-19 @ 19:00), Max: 39.6 (02-08-19 @ 19:00)  T(F): 103.2 (02-08-19 @ 19:00), Max: 103.2 (02-08-19 @ 19:00)  HR: 83 (02-08-19 @ 19:45) (83 - 86)  BP: 125/60 (02-08-19 @ 19:45) (125/60 - 159/80)  BP(mean): --  RR: 20 (02-08-19 @ 19:45) (20 - 32)  SpO2: 94% (02-08-19 @ 19:45) (94% - 97%)  Wt(kg): --    PHYSICAL EXAM:    Constitutional: Elderly, obese  female laying in bed, uncomfortable appearing with audible wheezing.   Head: NC/AT  Eyes: PERRL, EOMI, anicteric sclera  ENT: No nasal discharge, dry mucous membranes   Neck: supple; no JVD or thyromegaly  Respiratory: Patient w/ significant end expiratory wheezing most prominent in right lung field. Unable to speak in full sentences using accessory muscles of respiration.   Cardiac: Tachycardic, regular, non murmurs appreciated   Gastrointestinal: Obese, soft, non-tender, non-distended   Back: spine midline, no bony tenderness or step-offs; no CVAT B/L  Extremities: WWP, no clubbing, cyanosis, 2+ b/l LE pitting edema   Musculoskeletal: NROM x4; no joint swelling, tenderness or erythema  Vascular: 2+ radial, femoral, DP/PT pulses B/L  Neurologic: AAOx3; Significantly diminished strength b/l LE. No focal deficits noted.

## 2019-02-08 NOTE — ED PROVIDER NOTE - ATTENDING CONTRIBUTION TO CARE
72 yo F with hx of morbid obesity, HTN, herniated lumbar discs, chronic allergic sinusitis, knee osteoarthritis, chronic le edema, venous stasis, presenting with bilateral lower extremity weakness, generalized for several months progressive, worse with sitting to standing.  This AM on toilet unable to get up on toilet for 8 hours.  No trauma or fall.  Has no pain in legs but has 1/10 baseline unchanged pain in lower lumbar back.  Pt denies incontinence, groin anesthesia, numbness, tingling, fever, chills.  Exam pt well appearing, nad, AOx3, obese, CN2-12 intact, 5/5 bl UE, 3/5 RLE and 4/5 LLE.  Nl rectal tone, no saddle anesthesia, mild midline lower lumbar ttp, no deformity, from back.  Deferred gait exam at this time.  DTRs slightly hypoactive bl patellar reflexes.  DDx includes disc disease, electrolyte abn, deconditioning and cord compression (low prob).  Plan labs, cpk, ua, neuro consult and possible MRI.

## 2019-02-08 NOTE — H&P ADULT - NSHPLABSRESULTS_GEN_ALL_CORE
.  LABS:                         12.1   34.96 )-----------( 112      ( 08 Feb 2019 15:43 )             35.1     02-08    136  |  96  |  59<H>  ----------------------------<  291<H>  2.7<LL>   |  24  |  1.05    Ca    8.4      08 Feb 2019 15:43          CARDIAC MARKERS ( 08 Feb 2019 15:43 )  x     / 0.01 ng/mL / 246 U/L / x     / 2.6 ng/mL      Serum Pro-Brain Natriuretic Peptide: 2658 pg/mL (02-08 @ 15:43)        RADIOLOGY, EKG & ADDITIONAL TESTS: Reviewed.

## 2019-02-08 NOTE — ED ADULT NURSE REASSESSMENT NOTE - NS ED NURSE REASSESS COMMENT FT1
Pt significantly improved. Mild wheezing continues especially in the right lobes. Pt mental status returned to baseline. Pt elicits increased comfort. Continued cardiac monitoring. VS WNL.

## 2019-02-08 NOTE — ED ADULT NURSE NOTE - CHPI ED NUR SYMPTOMS NEG
no numbness/no abrasion/no bleeding/no deformity/no fever/no tingling/no loss of consciousness/no confusion/no vomiting/no weakness

## 2019-02-08 NOTE — H&P ADULT - FAMILY HISTORY
Mother  Still living? Unknown  Family history of obesity, Age at diagnosis: Age Unknown Mother  Still living? Unknown  Family history of obesity, Age at diagnosis: Age Unknown  Family history of influenza, Age at diagnosis: Age Unknown     Father  Still living? Unknown  Family history of cerebrovascular accident (CVA), Age at diagnosis: Age Unknown

## 2019-02-08 NOTE — H&P ADULT - PROBLEM SELECTOR PLAN 7
Patient w/ history of HTN but unsure of which medications she takes. As per outpatient records patient on Norvasc, HCTZ and Atenolol at home.   -Will hold for now pending additional medication reconciliation

## 2019-02-08 NOTE — H&P ADULT - PROBLEM SELECTOR PLAN 1
Patient with Tmax of 103, WBC 34.96 with 33% bands and Lactic acid of 2.5. Chest xray with potential small right sided consolidation. RVP negative.   -Patient s/p dose of Ceftriaxone and Azithromycin in the ED. Will continue patient on Ceftriaxone and Azithromycin for now  -Patient given 1L NS bolus in the ED. Will hold off on giving additional fluids at this time given patient with enlarged heart on Cxray, elevated BNP >2000 and b/l LE edema  -F/u Blood cultures, UA and Urine cultures   -F/u repeat lactic acid at 10pm

## 2019-02-08 NOTE — ED PROVIDER NOTE - CARE PLAN
Principal Discharge DX:	Weakness Principal Discharge DX:	Weakness  Goal:	improved weakness  Assessment and plan of treatment:	repleting potassium and infectious workup. neuro reccs pending. no cord compression on MRI. Principal Discharge DX:	Sepsis, due to unspecified organism  Goal:	improved weakness  Assessment and plan of treatment:	repleting potassium and infectious workup. neuro reccs pending. no cord compression on MRI.  Secondary Diagnosis:	Pneumonia of both lungs due to infectious organism, unspecified part of lung  Secondary Diagnosis:	Severe persistent reactive airway disease with acute exacerbation

## 2019-02-08 NOTE — H&P ADULT - PROBLEM SELECTOR PLAN 3
Patient presented w/ b/l LE weakness and difficulty standing from commode this morning. Patient w/ chronic lower back pain for which MRI was negative for acute signs of cord compression and only notable for disc protrusion in L3-L4, L4-L5, L5-S1. Xray lumbar spine normal. Etiology of weakness likely secondary to deconditioning Patient presented w/ b/l LE weakness and difficulty standing from commode this morning. Patient w/ chronic lower back pain for which MRI was negative for acute signs of cord compression and only notable for disc protrusion in L3-L4, L4-L5, L5-S1. Xray lumbar spine normal. Etiology likely in the setting of infection vs. electrolyte/metabolic given pt w/ hypokalemia and elevated CK of 246 vs. myopathy.   -Neurology following --appreciate recs   -Pending MRI Cervical and Thoracic spine +UA w/ severe sepsis; will treat w/ Ceftriaxone, followup UCTX

## 2019-02-09 DIAGNOSIS — M54.5 LOW BACK PAIN: ICD-10-CM

## 2019-02-09 LAB
ALBUMIN SERPL ELPH-MCNC: 2.2 G/DL — LOW (ref 3.3–5)
ALP SERPL-CCNC: 141 U/L — HIGH (ref 40–120)
ALT FLD-CCNC: 52 U/L — HIGH (ref 10–45)
ANION GAP SERPL CALC-SCNC: 17 MMOL/L — SIGNIFICANT CHANGE UP (ref 5–17)
APTT BLD: 27.9 SEC — SIGNIFICANT CHANGE UP (ref 27.5–36.3)
AST SERPL-CCNC: 46 U/L — HIGH (ref 10–40)
BASOPHILS # BLD AUTO: 0 K/UL — SIGNIFICANT CHANGE UP (ref 0–0.2)
BASOPHILS NFR BLD AUTO: 0 % — SIGNIFICANT CHANGE UP (ref 0–2)
BILIRUB SERPL-MCNC: 0.6 MG/DL — SIGNIFICANT CHANGE UP (ref 0.2–1.2)
BUN SERPL-MCNC: 46 MG/DL — HIGH (ref 7–23)
CALCIUM SERPL-MCNC: 8.3 MG/DL — LOW (ref 8.4–10.5)
CHLORIDE SERPL-SCNC: 101 MMOL/L — SIGNIFICANT CHANGE UP (ref 96–108)
CK SERPL-CCNC: 113 U/L — SIGNIFICANT CHANGE UP (ref 25–170)
CO2 SERPL-SCNC: 21 MMOL/L — LOW (ref 22–31)
CREAT SERPL-MCNC: 0.99 MG/DL — SIGNIFICANT CHANGE UP (ref 0.5–1.3)
E COLI DNA BLD POS QL NAA+NON-PROBE: SIGNIFICANT CHANGE UP
EOSINOPHIL # BLD AUTO: 0 K/UL — SIGNIFICANT CHANGE UP (ref 0–0.5)
EOSINOPHIL NFR BLD AUTO: 0 % — SIGNIFICANT CHANGE UP (ref 0–6)
GLUCOSE BLDC GLUCOMTR-MCNC: 370 MG/DL — HIGH (ref 70–99)
GLUCOSE BLDC GLUCOMTR-MCNC: 397 MG/DL — HIGH (ref 70–99)
GLUCOSE SERPL-MCNC: 380 MG/DL — HIGH (ref 70–99)
GRAM STN FLD: SIGNIFICANT CHANGE UP
HAV IGM SER-ACNC: SIGNIFICANT CHANGE UP
HBA1C BLD-MCNC: 6.6 % — HIGH (ref 4–5.6)
HBV CORE IGM SER-ACNC: SIGNIFICANT CHANGE UP
HBV SURFACE AG SER-ACNC: SIGNIFICANT CHANGE UP
HCT VFR BLD CALC: 33.8 % — LOW (ref 34.5–45)
HCV AB S/CO SERPL IA: 0.16 S/CO — SIGNIFICANT CHANGE UP
HCV AB SERPL-IMP: SIGNIFICANT CHANGE UP
HGB BLD-MCNC: 11.5 G/DL — SIGNIFICANT CHANGE UP (ref 11.5–15.5)
INR BLD: 1.41 — HIGH (ref 0.88–1.16)
LACTATE SERPL-SCNC: 2.3 MMOL/L — HIGH (ref 0.5–2)
LACTATE SERPL-SCNC: 2.8 MMOL/L — HIGH (ref 0.5–2)
LYMPHOCYTES # BLD AUTO: 0.91 K/UL — LOW (ref 1–3.3)
LYMPHOCYTES # BLD AUTO: 2.6 % — LOW (ref 13–44)
MAGNESIUM SERPL-MCNC: 3 MG/DL — HIGH (ref 1.6–2.6)
MCHC RBC-ENTMCNC: 29 PG — SIGNIFICANT CHANGE UP (ref 27–34)
MCHC RBC-ENTMCNC: 34 GM/DL — SIGNIFICANT CHANGE UP (ref 32–36)
MCV RBC AUTO: 85.4 FL — SIGNIFICANT CHANGE UP (ref 80–100)
METHOD TYPE: SIGNIFICANT CHANGE UP
MONOCYTES # BLD AUTO: 0.32 K/UL — SIGNIFICANT CHANGE UP (ref 0–0.9)
MONOCYTES NFR BLD AUTO: 0.9 % — LOW (ref 2–14)
NEUTROPHILS # BLD AUTO: 33.84 K/UL — HIGH (ref 1.8–7.4)
NEUTROPHILS NFR BLD AUTO: 96.5 % — HIGH (ref 43–77)
PLATELET # BLD AUTO: 100 K/UL — LOW (ref 150–400)
POTASSIUM SERPL-MCNC: 3.1 MMOL/L — LOW (ref 3.5–5.3)
POTASSIUM SERPL-SCNC: 3.1 MMOL/L — LOW (ref 3.5–5.3)
PROT SERPL-MCNC: 6.6 G/DL — SIGNIFICANT CHANGE UP (ref 6–8.3)
PROTHROM AB SERPL-ACNC: 16.1 SEC — HIGH (ref 10–12.9)
RBC # BLD: 3.96 M/UL — SIGNIFICANT CHANGE UP (ref 3.8–5.2)
RBC # FLD: 17.1 % — HIGH (ref 10.3–14.5)
SODIUM SERPL-SCNC: 139 MMOL/L — SIGNIFICANT CHANGE UP (ref 135–145)
SPECIMEN SOURCE: SIGNIFICANT CHANGE UP
SPECIMEN SOURCE: SIGNIFICANT CHANGE UP
WBC # BLD: 35.07 K/UL — HIGH (ref 3.8–10.5)
WBC # FLD AUTO: 35.07 K/UL — HIGH (ref 3.8–10.5)

## 2019-02-09 PROCEDURE — 71045 X-RAY EXAM CHEST 1 VIEW: CPT | Mod: 26

## 2019-02-09 PROCEDURE — 93306 TTE W/DOPPLER COMPLETE: CPT | Mod: 26

## 2019-02-09 PROCEDURE — 99233 SBSQ HOSP IP/OBS HIGH 50: CPT | Mod: GC

## 2019-02-09 RX ORDER — INSULIN GLARGINE 100 [IU]/ML
20 INJECTION, SOLUTION SUBCUTANEOUS AT BEDTIME
Qty: 0 | Refills: 0 | Status: DISCONTINUED | OUTPATIENT
Start: 2019-02-09 | End: 2019-02-10

## 2019-02-09 RX ORDER — POTASSIUM CHLORIDE 20 MEQ
10 PACKET (EA) ORAL ONCE
Qty: 0 | Refills: 0 | Status: COMPLETED | OUTPATIENT
Start: 2019-02-09 | End: 2019-02-09

## 2019-02-09 RX ORDER — LORATADINE 10 MG/1
10 TABLET ORAL DAILY
Qty: 0 | Refills: 0 | Status: DISCONTINUED | OUTPATIENT
Start: 2019-02-09 | End: 2019-02-12

## 2019-02-09 RX ORDER — POTASSIUM CHLORIDE 20 MEQ
40 PACKET (EA) ORAL ONCE
Qty: 0 | Refills: 0 | Status: COMPLETED | OUTPATIENT
Start: 2019-02-09 | End: 2019-02-09

## 2019-02-09 RX ORDER — HUMAN INSULIN 100 [IU]/ML
10 INJECTION, SUSPENSION SUBCUTANEOUS ONCE
Qty: 0 | Refills: 0 | Status: COMPLETED | OUTPATIENT
Start: 2019-02-09 | End: 2019-02-09

## 2019-02-09 RX ORDER — IOHEXOL 300 MG/ML
30 INJECTION, SOLUTION INTRAVENOUS ONCE
Qty: 0 | Refills: 0 | Status: DISCONTINUED | OUTPATIENT
Start: 2019-02-09 | End: 2019-02-09

## 2019-02-09 RX ORDER — INSULIN LISPRO 100/ML
6 VIAL (ML) SUBCUTANEOUS
Qty: 0 | Refills: 0 | Status: DISCONTINUED | OUTPATIENT
Start: 2019-02-09 | End: 2019-02-10

## 2019-02-09 RX ADMIN — INSULIN GLARGINE 20 UNIT(S): 100 INJECTION, SOLUTION SUBCUTANEOUS at 22:36

## 2019-02-09 RX ADMIN — HEPARIN SODIUM 5000 UNIT(S): 5000 INJECTION INTRAVENOUS; SUBCUTANEOUS at 22:35

## 2019-02-09 RX ADMIN — Medication 100 MILLIEQUIVALENT(S): at 22:35

## 2019-02-09 RX ADMIN — CEFTRIAXONE 100 GRAM(S): 500 INJECTION, POWDER, FOR SOLUTION INTRAMUSCULAR; INTRAVENOUS at 05:18

## 2019-02-09 RX ADMIN — Medication 40 MILLIEQUIVALENT(S): at 22:36

## 2019-02-09 RX ADMIN — LORATADINE 10 MILLIGRAM(S): 10 TABLET ORAL at 23:49

## 2019-02-09 RX ADMIN — Medication 3 MILLILITER(S): at 04:02

## 2019-02-09 RX ADMIN — HEPARIN SODIUM 5000 UNIT(S): 5000 INJECTION INTRAVENOUS; SUBCUTANEOUS at 06:12

## 2019-02-09 RX ADMIN — Medication 40 MILLIGRAM(S): at 06:12

## 2019-02-09 RX ADMIN — Medication 10: at 08:38

## 2019-02-09 RX ADMIN — HUMAN INSULIN 10 UNIT(S): 100 INJECTION, SUSPENSION SUBCUTANEOUS at 11:35

## 2019-02-09 RX ADMIN — Medication 8: at 18:02

## 2019-02-09 RX ADMIN — Medication 6 UNIT(S): at 18:02

## 2019-02-09 RX ADMIN — CEFTRIAXONE 100 GRAM(S): 500 INJECTION, POWDER, FOR SOLUTION INTRAMUSCULAR; INTRAVENOUS at 19:12

## 2019-02-09 RX ADMIN — Medication 8: at 22:36

## 2019-02-09 RX ADMIN — Medication 10: at 12:21

## 2019-02-09 RX ADMIN — PANTOPRAZOLE SODIUM 40 MILLIGRAM(S): 20 TABLET, DELAYED RELEASE ORAL at 06:12

## 2019-02-09 RX ADMIN — HEPARIN SODIUM 5000 UNIT(S): 5000 INJECTION INTRAVENOUS; SUBCUTANEOUS at 13:00

## 2019-02-09 NOTE — PHYSICAL THERAPY INITIAL EVALUATION ADULT - PERTINENT HX OF CURRENT PROBLEM, REHAB EVAL
72 y/o F w/ PMHx notable for lumbar herniated discs, HTN, allergic sinusitis (takes claritin), b/l knee OA who initially presented from home w/ b/l LE weakness but found to be in respiratory distress likely in the setting of an infectious etiology.

## 2019-02-09 NOTE — PHYSICAL THERAPY INITIAL EVALUATION ADULT - ADDITIONAL COMMENTS
Pt lives alone in apartment building with 1 flight to enter, and 1 additional flight to unit. Pt reports functional decline in recent weeks. Uses commode/elevated toilet seat for toileting, uses wash basin for washing up. Pt reports used to use bus but has not been to appointments lately. Has community support for groceries, someone from [employer] deliver 1x/week. Pt reports sleeping in recliner as difficulty getting OOB. Denies falls, help in home other than cleaning person.

## 2019-02-09 NOTE — PROGRESS NOTE ADULT - ASSESSMENT
weakness, improving, which appears to mirror infection and multiple lab abnormalities, the question is still why she is weak, although she is significantly stronger today, ddx includes unlikely cord cx, 2. metabolic, at least to some degree 3. possible radicular weakness, ? , 4. myopathy  -EMG, can be done as out patient  esr, aldolase, b12, folate, rpr , thryoid profile  -mri c, mri t spine

## 2019-02-09 NOTE — PROGRESS NOTE ADULT - PROBLEM SELECTOR PLAN 5
-2/2 infection and steroids  -NPH for now and begin basal/bolus regimen  -A1C 6.6 but FS have been extremely elevated

## 2019-02-09 NOTE — PHYSICAL THERAPY INITIAL EVALUATION ADULT - ACTIVE RANGE OF MOTION EXAMINATION, REHAB EVAL
bilateral upper extremity Active ROM was WFL (within functional limits)/bilateral  lower extremity Active ROM was WFL (within functional limits)/B hip flexion 0-100

## 2019-02-09 NOTE — PROGRESS NOTE ADULT - PROBLEM SELECTOR PLAN 8
chronic; encourage weight loss, pain medications prn; followup other MRI spine studies per neuro recs

## 2019-02-09 NOTE — PROGRESS NOTE ADULT - SUBJECTIVE AND OBJECTIVE BOX
the patient is in good spirits and feels like she is much better,   her lower extremity strength is 4/5 proximal and 5/5 distal w/ decreased dtr  MRI l/s 2/7/2019: IMPRESSION:   1.  No cord compression.  2.  Small disc herniations at L3-4, L4-5, and L5-S1.  2/8/2019: crp 34, hba1c 6.6 thyroid sh mildly low 3.06,

## 2019-02-09 NOTE — PHYSICAL THERAPY INITIAL EVALUATION ADULT - GENERAL OBSERVATIONS, REHAB EVAL
Spoke to RN Franki, pt cleared for PT. Pt rcvd semi supine, +4L NC, +bedalarm, with MD Yadav. Pt admitted for LE weakness and wheezing. MRI lumber (-) cord compression. Pt reports feeling better, reports 5/10 back pain. Pt A&Ox3 with increased time processing commands. Pt tolerated session fairly well, task breakdown and increased time for bed mob and transfers. Pt demo min A bed mob, Emily trasnfers, side/forward steps 4x2 Emily. Pt left as found, in NAD, +callbell, +bedalarm, +4L NC. FIM gait=1.

## 2019-02-09 NOTE — PROGRESS NOTE ADULT - ASSESSMENT
72 y/o F w/ PMHx notable for lumbar herniated discs, HTN, allergic sinusitis (takes claritin), b/l knee OA who initially presented from home w/ b/l LE weakness found to have e. coli bacteremia.

## 2019-02-09 NOTE — PHYSICAL THERAPY INITIAL EVALUATION ADULT - GAIT DEVIATIONS NOTED, PT EVAL
decreased stride length/decreased weight-shifting ability/increased time in double stance/decreased yandel/decreased step length

## 2019-02-09 NOTE — PROGRESS NOTE ADULT - PROBLEM SELECTOR PLAN 2
-2/2 bronchospasm of unknown etiology vs dCHF exacerbation  -improved on exam now, no respiratory distress, s/p steroids w/ improvement, c/w steroids  -echo w/ dCHF, edema on exam, given sepsis cautious w/ IVF, if resp distress give lasix,

## 2019-02-09 NOTE — PROGRESS NOTE ADULT - PROBLEM SELECTOR PLAN 9
1) PCP Contacted on Admission: (Y/N) --> Name & Phone #: Dr. Garcia   2) Date of Contact with PCP:  3) PCP Contacted at Discharge: (Y/N, N/A)  4) Summary of Handoff Given to PCP:   5) Post-Discharge Appointment Date and Location:

## 2019-02-09 NOTE — PROVIDER CONTACT NOTE (CRITICAL VALUE NOTIFICATION) - SITUATION
aerobic bottles, gram stain with gram negative rods from 2/8
2 anaerobic bottles collected on 2/8  Gram negative rods

## 2019-02-09 NOTE — PHYSICAL THERAPY INITIAL EVALUATION ADULT - DIAGNOSIS, PT EVAL
5A: Primary Prevention/Risk Reduction for Loss of Balance and Falling, 4C: Impaired Muscle Performance

## 2019-02-09 NOTE — PROGRESS NOTE ADULT - SUBJECTIVE AND OBJECTIVE BOX
Patient is a 73y old  Female who presents with a chief complaint of Weakness (2019 19:22)      INTERVAL HPI/OVERNIGHT EVENTS: Feels better, states she gets back pain if she doesn't eat protein. Has not been feeling well for weeks. Denies cough or sputum production.     Review of Systems: 12 point review of systems otherwise negative      MEDICATIONS  (STANDING):  cefTRIAXone   IVPB 1 Gram(s) IV Intermittent every 24 hours  dextrose 5%. 1000 milliLiter(s) (50 mL/Hr) IV Continuous <Continuous>  dextrose 50% Injectable 12.5 Gram(s) IV Push once  dextrose 50% Injectable 25 Gram(s) IV Push once  dextrose 50% Injectable 25 Gram(s) IV Push once  heparin  Injectable 5000 Unit(s) SubCutaneous every 8 hours  influenza   Vaccine 0.5 milliLiter(s) IntraMuscular once  insulin glargine Injectable (LANTUS) 20 Unit(s) SubCutaneous at bedtime  insulin lispro (HumaLOG) corrective regimen sliding scale   SubCutaneous Before meals and at bedtime  insulin lispro Injectable (HumaLOG) 6 Unit(s) SubCutaneous three times a day before meals  iohexol 300 mG (iodine)/mL Oral Solution 30 milliLiter(s) Oral once  pantoprazole    Tablet 40 milliGRAM(s) Oral before breakfast  predniSONE   Tablet 40 milliGRAM(s) Oral daily    MEDICATIONS  (PRN):  dextrose 40% Gel 15 Gram(s) Oral once PRN Blood Glucose LESS THAN 70 milliGRAM(s)/deciliter  glucagon  Injectable 1 milliGRAM(s) IntraMuscular once PRN Glucose LESS THAN 70 milligrams/deciliter      Allergies    No Known Allergies    Intolerances          Vital Signs Last 24 Hrs  T(C): 36.3 (2019 12:49), Max: 39.6 (2019 19:00)  T(F): 97.3 (2019 12:49), Max: 103.2 (2019 19:00)  HR: 72 (2019 12:49) (70 - 86)  BP: 137/72 (2019 12:49) (111/64 - 158/64)  BP(mean): --  RR: 19 (2019 12:49) (19 - 32)  SpO2: 96% (2019 12:49) (93% - 99%)  CAPILLARY BLOOD GLUCOSE      POCT Blood Glucose.: 370 mg/dL (2019 12:19)  POCT Blood Glucose.: 397 mg/dL (2019 08:28)  POCT Blood Glucose.: 321 mg/dL (2019 22:03)       @ 07:01  -   @ 07:00  --------------------------------------------------------  IN: 0 mL / OUT: 800 mL / NET: -800 mL        Physical Exam:    Daily Height in cm: 167.64 (2019 21:56)    Daily   General:  fatigued appearing  HEENT:  Nonicteric,  CV:  RRR,   Lungs:  decreased breath sounds at bases b/l, otherwise clear  Abdomen:  Soft, non-tender, no distended, positive BS, no hepatosplenomegaly  Extremities:  2+  edema b/l  Skin:  Warm and dry, no rashes  :  No kim  No Restraints    LABS:                        11.5   35.07 )-----------( 100      ( 2019 06:40 )             33.8         139  |  101  |  46<H>  ----------------------------<  380<H>  3.1<L>   |  21<L>  |  0.99    Ca    8.3<L>      2019 06:39  Mg     3.0         TPro  6.6  /  Alb  2.2<L>  /  TBili  0.6  /  DBili  x   /  AST  46<H>  /  ALT  52<H>  /  AlkPhos  141<H>      PT/INR - ( 2019 06:39 )   PT: 16.1 sec;   INR: 1.41          PTT - ( 2019 06:39 )  PTT:27.9 sec  Urinalysis Basic - ( 2019 21:28 )    Color: Yellow / Appearance: Cloudy / S.030 / pH: x  Gluc: x / Ketone: NEGATIVE  / Bili: Negative / Urobili: 0.2 E.U./dL   Blood: x / Protein: 30 mg/dL / Nitrite: POSITIVE   Leuk Esterase: Large / RBC: < 5 /HPF / WBC Many /HPF   Sq Epi: x / Non Sq Epi: 0-5 /HPF / Bacteria: Many /HPF          RADIOLOGY & ADDITIONAL TESTS:    ---------------------------------------------------------------------------  I personally reviewed: [  ]EKG   [  ]CXR    [  ] CT    [  ]Other  ---------------------------------------------------------------------------  PLEASE CHECK WHEN PRESENT:     [  ]Heart Failure     [  ] Acute     [  ] Acute on Chronic     [  ] Chronic  -------------------------------------------------------------------     [  ]Diastolic [HFpEF]     [  ]Systolic [HFrEF]     [  ]Combined [HFpEF & HFrEF]     [  ]Other:  -------------------------------------------------------------------  [  ]KAREN     [  ]ATN     [  ]Reneal Medullary Necrosis     [  ]Renal Cortical Necrosis     [  ]Other Pathological Lesions:    [  ]CKD 1  [  ]CKD 2  [  ]CKD 3  [  ]CKD 4  [  ]CKD 5  [  ]Other  -------------------------------------------------------------------  [  ]Other/Unspecified:    --------------------------------------------------------------------    Abdominal Nutritional Status  [  ]Malnutrition: See Nutrition Note  [  ]Cachexia  [  ]Other:   [  ]Supplement Ordered:  [  ]Morbid Obesity (BMI >=40]

## 2019-02-09 NOTE — PHYSICAL THERAPY INITIAL EVALUATION ADULT - IMPAIRMENTS FOUND, PT EVAL
ergonomics and body mechanics/gait, locomotion, and balance/posture/ventilation and respiration/gas exchange/ROM/aerobic capacity/endurance

## 2019-02-10 DIAGNOSIS — R63.8 OTHER SYMPTOMS AND SIGNS CONCERNING FOOD AND FLUID INTAKE: ICD-10-CM

## 2019-02-10 DIAGNOSIS — I50.31 ACUTE DIASTOLIC (CONGESTIVE) HEART FAILURE: ICD-10-CM

## 2019-02-10 DIAGNOSIS — I50.33 ACUTE ON CHRONIC DIASTOLIC (CONGESTIVE) HEART FAILURE: ICD-10-CM

## 2019-02-10 DIAGNOSIS — R78.81 BACTEREMIA: ICD-10-CM

## 2019-02-10 LAB
ALBUMIN SERPL ELPH-MCNC: 2.2 G/DL — LOW (ref 3.3–5)
ALP SERPL-CCNC: 108 U/L — SIGNIFICANT CHANGE UP (ref 40–120)
ALT FLD-CCNC: 42 U/L — SIGNIFICANT CHANGE UP (ref 10–45)
ANION GAP SERPL CALC-SCNC: 11 MMOL/L — SIGNIFICANT CHANGE UP (ref 5–17)
AST SERPL-CCNC: 25 U/L — SIGNIFICANT CHANGE UP (ref 10–40)
BILIRUB SERPL-MCNC: 0.5 MG/DL — SIGNIFICANT CHANGE UP (ref 0.2–1.2)
BUN SERPL-MCNC: 44 MG/DL — HIGH (ref 7–23)
CALCIUM SERPL-MCNC: 8.3 MG/DL — LOW (ref 8.4–10.5)
CHLORIDE SERPL-SCNC: 103 MMOL/L — SIGNIFICANT CHANGE UP (ref 96–108)
CO2 SERPL-SCNC: 25 MMOL/L — SIGNIFICANT CHANGE UP (ref 22–31)
CREAT SERPL-MCNC: 0.97 MG/DL — SIGNIFICANT CHANGE UP (ref 0.5–1.3)
CULTURE RESULTS: SIGNIFICANT CHANGE UP
FOLATE SERPL-MCNC: 13.6 NG/ML — SIGNIFICANT CHANGE UP
GLUCOSE SERPL-MCNC: 241 MG/DL — HIGH (ref 70–99)
HCT VFR BLD CALC: 35.1 % — SIGNIFICANT CHANGE UP (ref 34.5–45)
HCV AB S/CO SERPL IA: 0.17 S/CO — SIGNIFICANT CHANGE UP
HCV AB SERPL-IMP: SIGNIFICANT CHANGE UP
HGB BLD-MCNC: 11.7 G/DL — SIGNIFICANT CHANGE UP (ref 11.5–15.5)
MAGNESIUM SERPL-MCNC: 2.8 MG/DL — HIGH (ref 1.6–2.6)
MCHC RBC-ENTMCNC: 28.5 PG — SIGNIFICANT CHANGE UP (ref 27–34)
MCHC RBC-ENTMCNC: 33.3 GM/DL — SIGNIFICANT CHANGE UP (ref 32–36)
MCV RBC AUTO: 85.4 FL — SIGNIFICANT CHANGE UP (ref 80–100)
NRBC # BLD: 0 /100 WBCS — SIGNIFICANT CHANGE UP (ref 0–0)
PLATELET # BLD AUTO: 83 K/UL — LOW (ref 150–400)
POTASSIUM SERPL-MCNC: 3.9 MMOL/L — SIGNIFICANT CHANGE UP (ref 3.5–5.3)
POTASSIUM SERPL-SCNC: 3.9 MMOL/L — SIGNIFICANT CHANGE UP (ref 3.5–5.3)
PROT SERPL-MCNC: 6.9 G/DL — SIGNIFICANT CHANGE UP (ref 6–8.3)
RBC # BLD: 4.11 M/UL — SIGNIFICANT CHANGE UP (ref 3.8–5.2)
RBC # FLD: 17.3 % — HIGH (ref 10.3–14.5)
SODIUM SERPL-SCNC: 139 MMOL/L — SIGNIFICANT CHANGE UP (ref 135–145)
SPECIMEN SOURCE: SIGNIFICANT CHANGE UP
VIT B12 SERPL-MCNC: >2000 PG/ML — HIGH (ref 232–1245)
WBC # BLD: 29.71 K/UL — HIGH (ref 3.8–10.5)
WBC # FLD AUTO: 29.71 K/UL — HIGH (ref 3.8–10.5)

## 2019-02-10 PROCEDURE — 99233 SBSQ HOSP IP/OBS HIGH 50: CPT | Mod: GC

## 2019-02-10 RX ORDER — CEFTRIAXONE 500 MG/1
2 INJECTION, POWDER, FOR SOLUTION INTRAMUSCULAR; INTRAVENOUS EVERY 24 HOURS
Qty: 0 | Refills: 0 | Status: DISCONTINUED | OUTPATIENT
Start: 2019-02-10 | End: 2019-02-11

## 2019-02-10 RX ORDER — CEFTRIAXONE 500 MG/1
1 INJECTION, POWDER, FOR SOLUTION INTRAMUSCULAR; INTRAVENOUS ONCE
Qty: 0 | Refills: 0 | Status: COMPLETED | OUTPATIENT
Start: 2019-02-10 | End: 2019-02-10

## 2019-02-10 RX ORDER — INSULIN GLARGINE 100 [IU]/ML
24 INJECTION, SOLUTION SUBCUTANEOUS AT BEDTIME
Qty: 0 | Refills: 0 | Status: DISCONTINUED | OUTPATIENT
Start: 2019-02-10 | End: 2019-02-11

## 2019-02-10 RX ORDER — INSULIN LISPRO 100/ML
8 VIAL (ML) SUBCUTANEOUS
Qty: 0 | Refills: 0 | Status: DISCONTINUED | OUTPATIENT
Start: 2019-02-10 | End: 2019-02-11

## 2019-02-10 RX ADMIN — Medication 6 UNIT(S): at 18:00

## 2019-02-10 RX ADMIN — Medication 40 MILLIGRAM(S): at 06:37

## 2019-02-10 RX ADMIN — PANTOPRAZOLE SODIUM 40 MILLIGRAM(S): 20 TABLET, DELAYED RELEASE ORAL at 06:37

## 2019-02-10 RX ADMIN — Medication 2: at 22:15

## 2019-02-10 RX ADMIN — LORATADINE 10 MILLIGRAM(S): 10 TABLET ORAL at 14:29

## 2019-02-10 RX ADMIN — CEFTRIAXONE 100 GRAM(S): 500 INJECTION, POWDER, FOR SOLUTION INTRAMUSCULAR; INTRAVENOUS at 08:53

## 2019-02-10 RX ADMIN — Medication 6 UNIT(S): at 08:47

## 2019-02-10 RX ADMIN — INSULIN GLARGINE 24 UNIT(S): 100 INJECTION, SOLUTION SUBCUTANEOUS at 23:00

## 2019-02-10 RX ADMIN — HEPARIN SODIUM 5000 UNIT(S): 5000 INJECTION INTRAVENOUS; SUBCUTANEOUS at 22:15

## 2019-02-10 RX ADMIN — HEPARIN SODIUM 5000 UNIT(S): 5000 INJECTION INTRAVENOUS; SUBCUTANEOUS at 06:37

## 2019-02-10 RX ADMIN — Medication 4: at 18:00

## 2019-02-10 RX ADMIN — HEPARIN SODIUM 5000 UNIT(S): 5000 INJECTION INTRAVENOUS; SUBCUTANEOUS at 14:29

## 2019-02-10 RX ADMIN — Medication 4: at 12:36

## 2019-02-10 RX ADMIN — Medication 6 UNIT(S): at 12:36

## 2019-02-10 RX ADMIN — CEFTRIAXONE 100 GRAM(S): 500 INJECTION, POWDER, FOR SOLUTION INTRAMUSCULAR; INTRAVENOUS at 21:18

## 2019-02-10 RX ADMIN — Medication 4: at 08:47

## 2019-02-10 NOTE — PROGRESS NOTE ADULT - PROBLEM SELECTOR PLAN 3
Patient presented with audible wheezing that improved s/p Duonebs x3, Solumedrol 125mg x1 and Mg 2g. Found to have elevated BNP >2000, b/l LE edema  -ABG consistent with respiratory alkalosis   -Duonebs q6hrs standing   -Prednisone d/c'ed given patient with uncontrolled FSG    -echo w/ dCHF, edema on exam, given sepsis cautious w/ IVF, if resp distress give lasix,   -c/w Nasal canula and BIPAP at bedside   -F/u Echocardiogram Patient with no hx of CHF, presented with w/ acute change in respiratory status with audible wheezing that improved s/p Duonebs x3, Solumedrol 125mg x1 and Mg 2g. Found to have elevated BNP >2000, b/l LE edema  -ABG consistent with respiratory alkalosis   -Duonebs q6hrs standing   -ECHO: EF 60%, mildly dilated RA, moderate AR, mild MR, sPAP 41mmHg  -CXR with congestion  -If resp distress will consider Lasix 20mg x 1

## 2019-02-10 NOTE — PROGRESS NOTE ADULT - PROBLEM SELECTOR PLAN 7
Patient w/ potassium of 2.7 upon admission.   -S/p 40meq PO x2 in the ED   -Repeat BMP w/ K of 2.9. Patient to be given 10meq x3 Resolved. Patient w/ potassium of 2.7 upon admission.   -S/p 40meq PO x2 in the ED, repeat BMP w/ K of 2.9, s/p 10meq x3  -K stable: 3.9 (2/10)  -replete PRN

## 2019-02-10 NOTE — PROGRESS NOTE ADULT - PROBLEM SELECTOR PLAN 8
Patient w/ history of HTN but unsure of which medications she takes. As per outpatient records patient on Norvasc, HCTZ and Atenolol at home.   -Will hold for now pending additional medication reconciliation Patient w/ history of HTN but unsure of which medications she takes. As per outpatient records patient on Norvasc, HCTZ and Atenolol at home.   -currently Normotensive (124/72)  -will hold for now given severe sepsis picture

## 2019-02-10 NOTE — PROGRESS NOTE ADULT - SUBJECTIVE AND OBJECTIVE BOX
Interval HPI/overnight events: No acute events reported overnight. Patient was seen and examined at bedside this am, no acute complaints. She reports that she still has back pain, due to not having protein, at home she takes a protein shake. Denies fever, chills, chest pain, palpitations, SOB, abdominal pain, nausea/vomiting, diarrhea, constipation.     VITAL SIGNS:  Vital Signs Last 24 Hrs  T(C): 36.5 (10 Feb 2019 05:52), Max: 36.5 (10 Feb 2019 05:52)  T(F): 97.7 (10 Feb 2019 05:52), Max: 97.7 (10 Feb 2019 05:52)  HR: 70 (10 Feb 2019 05:52) (70 - 76)  BP: 124/72 (10 Feb 2019 05:52) (124/72 - 141/75)  BP(mean): --  RR: 18 (10 Feb 2019 05:52) (18 - 20)  SpO2: 96% (10 Feb 2019 05:52) (95% - 96%)    PHYSICAL EXAM:  General: elderly female, in NAD, sitting up comfortably in bed, 4L NC in place  HEENT: normocephalic, atraumatic; PERRL, anicteric sclera; MMM  Neck: supple, no JVD  Cardiovascular: +S1/S2, RRR, no M/G/R  Respiratory: good air entry, decreased breath sounds on b/l bases; no wheezing, no rales, no rhonchi  Gastrointestinal: soft, NT/ND; +BSx4, no organomegaly  Extremities: WWP; 2+ pitting edema to b/l knees, no clubbing or cyanosis  Vascular: 2+ radial, DP/PT pulses B/L  Neurological: AAOx3; no focal deficits    MEDICATIONS:  MEDICATIONS  (STANDING):  cefTRIAXone   IVPB 2 Gram(s) IV Intermittent every 24 hours  dextrose 5%. 1000 milliLiter(s) (50 mL/Hr) IV Continuous <Continuous>  dextrose 50% Injectable 12.5 Gram(s) IV Push once  dextrose 50% Injectable 25 Gram(s) IV Push once  dextrose 50% Injectable 25 Gram(s) IV Push once  heparin  Injectable 5000 Unit(s) SubCutaneous every 8 hours  insulin glargine Injectable (LANTUS) 20 Unit(s) SubCutaneous at bedtime  insulin lispro (HumaLOG) corrective regimen sliding scale   SubCutaneous Before meals and at bedtime  insulin lispro Injectable (HumaLOG) 6 Unit(s) SubCutaneous three times a day before meals  loratadine 10 milliGRAM(s) Oral daily  pantoprazole    Tablet 40 milliGRAM(s) Oral before breakfast    MEDICATIONS  (PRN):  dextrose 40% Gel 15 Gram(s) Oral once PRN Blood Glucose LESS THAN 70 milliGRAM(s)/deciliter  glucagon  Injectable 1 milliGRAM(s) IntraMuscular once PRN Glucose LESS THAN 70 milligrams/deciliter      ALLERGIES:  Allergies    No Known Allergies    Intolerances        LABS:                        11.7   29.71 )-----------( 83       ( 10 Feb 2019 06:12 )             35.1     02-10    139  |  103  |  44<H>  ----------------------------<  241<H>  3.9   |  25  |  0.97    Ca    8.3<L>      10 Feb 2019 06:12  Mg     2.8     02-10    TPro  6.9  /  Alb  2.2<L>  /  TBili  0.5  /  DBili  x   /  AST  25  /  ALT  42  /  AlkPhos  108  02-10    PT/INR - ( 2019 06:39 )   PT: 16.1 sec;   INR: 1.41          PTT - ( 2019 06:39 )  PTT:27.9 sec  Urinalysis Basic - ( 2019 21:28 )    Color: Yellow / Appearance: Cloudy / S.030 / pH: x  Gluc: x / Ketone: NEGATIVE  / Bili: Negative / Urobili: 0.2 E.U./dL   Blood: x / Protein: 30 mg/dL / Nitrite: POSITIVE   Leuk Esterase: Large / RBC: < 5 /HPF / WBC Many /HPF   Sq Epi: x / Non Sq Epi: 0-5 /HPF / Bacteria: Many /HPF      POCT Blood Glucose.: 222 mg/dL (10 Feb 2019 08:27)      RADIOLOGY & ADDITIONAL TESTS: Reviewed.  MR Lumbar Spine No Cont (19 @ 17:22)    IMPRESSION:   1.  No cord compression.  2.  Small disc herniations at L3-4, L4-5, and L5-S1. Interval HPI/overnight events: No acute events reported overnight. Patient was seen and examined at bedside this am, no acute complaints. She reports that she still has back pain, due to not having protein, at home she takes a protein shake. Denies fever, chills, chest pain, palpitations, SOB, abdominal pain, nausea/vomiting, diarrhea, constipation.     VITAL SIGNS:  Vital Signs Last 24 Hrs  T(C): 36.5 (10 Feb 2019 05:52), Max: 36.5 (10 Feb 2019 05:52)  T(F): 97.7 (10 Feb 2019 05:52), Max: 97.7 (10 Feb 2019 05:52)  HR: 70 (10 Feb 2019 05:52) (70 - 76)  BP: 124/72 (10 Feb 2019 05:52) (124/72 - 141/75)  BP(mean): --  RR: 18 (10 Feb 2019 05:52) (18 - 20)  SpO2: 96% (10 Feb 2019 05:52) (95% - 96%)    PHYSICAL EXAM:  General: elderly female, in NAD, sitting up comfortably in bed, 4L NC in place  HEENT: normocephalic, atraumatic; PERRL, anicteric sclera; MMM  Neck: supple, no JVD  Cardiovascular: +S1/S2, RRR, no M/G/R  Respiratory: good air entry, decreased breath sounds on b/l bases; no wheezing, no rales, no rhonchi  Gastrointestinal: soft, NT/ND; +BSx4, no organomegaly  Extremities: WWP; 2+ pitting edema to b/l knees, no clubbing or cyanosis  Vascular: 2+ radial, DP/PT pulses B/L  Neurological: AAOx3; CN II-XII grossly intact, no focal deficits    MEDICATIONS:  MEDICATIONS  (STANDING):  cefTRIAXone   IVPB 2 Gram(s) IV Intermittent every 24 hours  dextrose 5%. 1000 milliLiter(s) (50 mL/Hr) IV Continuous <Continuous>  dextrose 50% Injectable 12.5 Gram(s) IV Push once  dextrose 50% Injectable 25 Gram(s) IV Push once  dextrose 50% Injectable 25 Gram(s) IV Push once  heparin  Injectable 5000 Unit(s) SubCutaneous every 8 hours  insulin glargine Injectable (LANTUS) 20 Unit(s) SubCutaneous at bedtime  insulin lispro (HumaLOG) corrective regimen sliding scale   SubCutaneous Before meals and at bedtime  insulin lispro Injectable (HumaLOG) 6 Unit(s) SubCutaneous three times a day before meals  loratadine 10 milliGRAM(s) Oral daily  pantoprazole    Tablet 40 milliGRAM(s) Oral before breakfast    MEDICATIONS  (PRN):  dextrose 40% Gel 15 Gram(s) Oral once PRN Blood Glucose LESS THAN 70 milliGRAM(s)/deciliter  glucagon  Injectable 1 milliGRAM(s) IntraMuscular once PRN Glucose LESS THAN 70 milligrams/deciliter      ALLERGIES:  Allergies    No Known Allergies    Intolerances        LABS:                        11.7   29.71 )-----------( 83       ( 10 Feb 2019 06:12 )             35.1     02-10    139  |  103  |  44<H>  ----------------------------<  241<H>  3.9   |  25  |  0.97    Ca    8.3<L>      10 Feb 2019 06:12  Mg     2.8     02-10    TPro  6.9  /  Alb  2.2<L>  /  TBili  0.5  /  DBili  x   /  AST  25  /  ALT  42  /  AlkPhos  108  0210    PT/INR - ( 2019 06:39 )   PT: 16.1 sec;   INR: 1.41          PTT - ( 2019 06:39 )  PTT:27.9 sec  Urinalysis Basic - ( 2019 21:28 )    Color: Yellow / Appearance: Cloudy / S.030 / pH: x  Gluc: x / Ketone: NEGATIVE  / Bili: Negative / Urobili: 0.2 E.U./dL   Blood: x / Protein: 30 mg/dL / Nitrite: POSITIVE   Leuk Esterase: Large / RBC: < 5 /HPF / WBC Many /HPF   Sq Epi: x / Non Sq Epi: 0-5 /HPF / Bacteria: Many /HPF      POCT Blood Glucose.: 222 mg/dL (10 Feb 2019 08:27)      RADIOLOGY & ADDITIONAL TESTS: Reviewed.  MR Lumbar Spine No Cont (19 @ 17:22)    IMPRESSION:   1.  No cord compression.  2.  Small disc herniations at L3-4, L4-5, and L5-S1.    Echocardiogram (19 @ 13:45)  Summary  Normal left ventricular size and wall thickness.The left ventricular wall   motion is normal.The left ventricular ejection fraction is 60%.The left   atrial size is normal. The right atrium is mildly dilated.The right ventricle is   normal in size and function.The mitral inflow pattern is consistent with   pseudo-normalization, suggestive of moderately elevated left atrial   pressure (15-20mmHg).  There is mild to moderate aortic regurgitation.There is   mild mitral regurgitation.There is mild tricuspid regurgitation.There is mild   pulmonary hypertension. The pulmonary artery systolic pressure is   estimated to be 41 mmHg.  There is mild pulmonic regurgitation.No aortic root   dilatation.The inferior vena cava is normal in size (<2.1 cm) with normal   inspiratory collapse (>50%) consistent with normal right atrial pressure.   There is no pericardial effusion.

## 2019-02-10 NOTE — PROGRESS NOTE ADULT - PROBLEM SELECTOR PLAN 9
chronic; encourage weight loss, pain medications prn; followup other MRI spine studies per neuro recs F: none  E: replete PRN  D: DASH/TLC    DVT ppx: Heparin 5000u SQ Q8h  GI ppx: on protonix 40mg qd    Dispo: [facility]  Code: Full

## 2019-02-10 NOTE — PROGRESS NOTE ADULT - PROBLEM SELECTOR PLAN 4
Resolved. Patient w/ acute change in respiratory status with audible wheezing that improved s/p Duonebs x3, Solumedrol 125mg x1 and Mg 2g. No reported hx of Asthma, COPD or Tobacco use. Etiology of reactive airway likely secondary to infectious lung process vs bronchospasm of unknown etiology vs acute CHF given pt w/ b/l LE edema and BNP >2000  -ABG consistent with respiratory alkalosis   -Duonebs q6hrs standing   -Prednisone d/c'ed given patient with uncontrolled FSG    -echo w/ dCHF, edema on exam, given sepsis cautious w/ IVF, if resp distress give lasix,   -c/w Nasal canula and BIPAP at bedside   -F/u Echocardiogram Resolved. Patient w/ acute change in respiratory status with audible wheezing that improved s/p Duonebs x3, Solumedrol 125mg x1 and Mg 2g. No reported hx of Asthma, COPD or Tobacco use. Etiology of reactive airway likely secondary to infectious lung process vs bronchospasm of unknown etiology vs acute CHF given pt w/ b/l LE edema and BNP >2000  -ABG consistent with respiratory alkalosis   -Duonebs q6hrs standing   -Prednisone d/c'ed given patient with uncontrolled FSG  -echo w/ dCHF, edema on exam, given sepsis cautious w/ IVF  - will consider lasix if in resp distress  - c/w Nasal canula, currently on 4L, will try to wean off

## 2019-02-10 NOTE — PROGRESS NOTE ADULT - PROBLEM SELECTOR PLAN 2
Patient presenting meeting severe sepsis criteria, found to have E. coli bacteremia, likely 2/2 UTI given UA positive on admission  -started on Ceftriaxone 2g Q24hrs  -management as above    #UTI  UA positive with large LE, positive Nitrites, many WBC and bacteria  -management as above Patient presenting meeting severe sepsis criteria, found to have E. coli bacteremia, likely 2/2 UTI given UA positive on admission  -started on Ceftriaxone 2g Q24hrs (2/10- )  -management as above    #UTI  UA positive with large LE, positive Nitrites, many WBC and bacteria  -management as above

## 2019-02-10 NOTE — PROGRESS NOTE ADULT - ASSESSMENT
72 y/o F w/ PMHx notable for lumbar herniated discs, HTN, allergic sinusitis (takes claritin), b/l knee OA who initially presented from home w/ b/l LE weakness but found to be in respiratory distress likely in the setting of an infectious etiology. 74 yo F with a PMH of lumbar herniated discs, HTN, allergic sinusitis (takes claritin), b/l knee OA who initially presented from home w/ b/l LE weakness, found to be in respiratory distress, meeting severe sepsis criteria (T 103, WBC 34.96, 33% bands and Lactic acid of 2.5), UA grossly positive, and CXR with questionable R sided consolidation, treated with Cef/Azithro, now with BCx growing E.coli

## 2019-02-10 NOTE — PROGRESS NOTE ADULT - PROBLEM SELECTOR PROBLEM 9
Chronic bilateral low back pain, with sciatica presence unspecified Nutrition, metabolism, and development symptoms

## 2019-02-10 NOTE — PROGRESS NOTE ADULT - PROBLEM SELECTOR PLAN 5
Patient presented w/ b/l LE weakness and difficulty standing from commode. Patient w/ chronic lower back pain for which MRI was negative for acute signs of cord compression and only notable for disc protrusion in L3-L4, L4-L5, L5-S1. Xray lumbar spine normal. Etiology likely in the setting of infection vs. electrolyte/metabolic given pt w/ hypokalemia on admission and elevated CK of 246 vs. myopathy.   -Neurology following --appreciate recs   -MRI L-spine: no cord compression  -f/u MRI C and T-spine  -Neurology recommend EMG as outpatient  -TSH: 0.309, free T4 0.92, ESR 93  -f/u Aldolase, B12, folate, RPR Patient presented w/ b/l LE weakness and difficulty standing from commode. Patient w/ chronic lower back pain for which MRI was negative for acute signs of cord compression and only notable for disc protrusion in L3-L4, L4-L5, L5-S1. Xray lumbar spine normal. Etiology likely in the setting of infection vs. electrolyte/metabolic given pt w/ hypokalemia on admission and elevated CK of 246 vs. myopathy.   -Neurology following --appreciate recs   -MRI L-spine: no cord compression  -f/u MRI C and T-spine  -Neurology recommend EMG as outpatient  -TSH: 0.309, free T4 0.92, ESR 93  -f/u Aldolase, B12, folate, RPR    #Chronic b/l lower back pain  -pain medications prn  -follow up MRI C and T spine per neuro recs  -management as above

## 2019-02-10 NOTE — PROGRESS NOTE ADULT - SUBJECTIVE AND OBJECTIVE BOX
the patient is in good spirits and feels close to her baseline, no c/o loss of b/b  her lower extremity strength is 5/5 proximal and 5/5 distal w/ decreased dtr, she asks not to walk,   MRI l/s 2/7/2019: IMPRESSION:   1.  No cord compression.  2.  Small disc herniations at L3-4, L4-5, and L5-S1.  2/8/2019: crp 34, hba1c 6.6 thyroid sh mildly low 3.06,           Assessment and Plan:   · Assessment		  weakness, improved, ? likely fully, which appears to mirror infection and multiple lab abnormalities, possibly she has had electrolye abnormalities, infection in the past, but there may be something underlying causing the weakness,   - f/u esr, aldolase, b12, folate, rpr , thryoid profile  -mri c, mri t spine can be done out patient  -consider emg/neuromuscular consult

## 2019-02-11 LAB
-  AMIKACIN: SIGNIFICANT CHANGE UP
-  AMPICILLIN/SULBACTAM: SIGNIFICANT CHANGE UP
-  AMPICILLIN/SULBACTAM: SIGNIFICANT CHANGE UP
-  AMPICILLIN: SIGNIFICANT CHANGE UP
-  AZTREONAM: SIGNIFICANT CHANGE UP
-  CEFAZOLIN: SIGNIFICANT CHANGE UP
-  CEFEPIME: SIGNIFICANT CHANGE UP
-  CEFOTAXIME: SIGNIFICANT CHANGE UP
-  CEFOXITIN: SIGNIFICANT CHANGE UP
-  CEFTAZIDIME: SIGNIFICANT CHANGE UP
-  CEFTRIAXONE: SIGNIFICANT CHANGE UP
-  CEFUROXIME: SIGNIFICANT CHANGE UP
-  CIPROFLOXACIN: SIGNIFICANT CHANGE UP
-  CIPROFLOXACIN: SIGNIFICANT CHANGE UP
-  ERTAPENEM: SIGNIFICANT CHANGE UP
-  GENTAMICIN: SIGNIFICANT CHANGE UP
-  GENTAMICIN: SIGNIFICANT CHANGE UP
-  LEVOFLOXACIN: SIGNIFICANT CHANGE UP
-  MEROPENEM: SIGNIFICANT CHANGE UP
-  MOXIFLOXACIN(AEROBIC): SIGNIFICANT CHANGE UP
-  PIPERACILLIN/TAZOBACTAM: SIGNIFICANT CHANGE UP
-  PIPERACILLIN/TAZOBACTAM: SIGNIFICANT CHANGE UP
-  TETRACYCLINE: SIGNIFICANT CHANGE UP
-  TIGECYCLINE: SIGNIFICANT CHANGE UP
-  TOBRAMYCIN: SIGNIFICANT CHANGE UP
-  TOBRAMYCIN: SIGNIFICANT CHANGE UP
-  TRIMETHOPRIM/SULFAMETHOXAZOLE: SIGNIFICANT CHANGE UP
-  TRIMETHOPRIM/SULFAMETHOXAZOLE: SIGNIFICANT CHANGE UP
ANION GAP SERPL CALC-SCNC: 10 MMOL/L — SIGNIFICANT CHANGE UP (ref 5–17)
BUN SERPL-MCNC: 37 MG/DL — HIGH (ref 7–23)
CALCIUM SERPL-MCNC: 8.4 MG/DL — SIGNIFICANT CHANGE UP (ref 8.4–10.5)
CHLORIDE SERPL-SCNC: 101 MMOL/L — SIGNIFICANT CHANGE UP (ref 96–108)
CO2 SERPL-SCNC: 28 MMOL/L — SIGNIFICANT CHANGE UP (ref 22–31)
CREAT SERPL-MCNC: 0.9 MG/DL — SIGNIFICANT CHANGE UP (ref 0.5–1.3)
CULTURE RESULTS: SIGNIFICANT CHANGE UP
CULTURE RESULTS: SIGNIFICANT CHANGE UP
GLUCOSE SERPL-MCNC: 169 MG/DL — HIGH (ref 70–99)
HCT VFR BLD CALC: 35 % — SIGNIFICANT CHANGE UP (ref 34.5–45)
HGB BLD-MCNC: 11.6 G/DL — SIGNIFICANT CHANGE UP (ref 11.5–15.5)
MAGNESIUM SERPL-MCNC: 2.2 MG/DL — SIGNIFICANT CHANGE UP (ref 1.6–2.6)
MCHC RBC-ENTMCNC: 28.5 PG — SIGNIFICANT CHANGE UP (ref 27–34)
MCHC RBC-ENTMCNC: 33.1 GM/DL — SIGNIFICANT CHANGE UP (ref 32–36)
MCV RBC AUTO: 86 FL — SIGNIFICANT CHANGE UP (ref 80–100)
METHOD TYPE: SIGNIFICANT CHANGE UP
NRBC # BLD: 0 /100 WBCS — SIGNIFICANT CHANGE UP (ref 0–0)
ORGANISM # SPEC MICROSCOPIC CNT: SIGNIFICANT CHANGE UP
PLATELET # BLD AUTO: 120 K/UL — LOW (ref 150–400)
POTASSIUM SERPL-MCNC: 3.1 MMOL/L — LOW (ref 3.5–5.3)
POTASSIUM SERPL-SCNC: 3.1 MMOL/L — LOW (ref 3.5–5.3)
RBC # BLD: 4.07 M/UL — SIGNIFICANT CHANGE UP (ref 3.8–5.2)
RBC # FLD: 17.7 % — HIGH (ref 10.3–14.5)
SODIUM SERPL-SCNC: 139 MMOL/L — SIGNIFICANT CHANGE UP (ref 135–145)
SPECIMEN SOURCE: SIGNIFICANT CHANGE UP
SPECIMEN SOURCE: SIGNIFICANT CHANGE UP
T PALLIDUM AB TITR SER: NEGATIVE — SIGNIFICANT CHANGE UP
WBC # BLD: 28.28 K/UL — HIGH (ref 3.8–10.5)
WBC # FLD AUTO: 28.28 K/UL — HIGH (ref 3.8–10.5)

## 2019-02-11 PROCEDURE — 99232 SBSQ HOSP IP/OBS MODERATE 35: CPT | Mod: GC

## 2019-02-11 PROCEDURE — 99232 SBSQ HOSP IP/OBS MODERATE 35: CPT

## 2019-02-11 PROCEDURE — 99222 1ST HOSP IP/OBS MODERATE 55: CPT | Mod: GC

## 2019-02-11 RX ORDER — POTASSIUM CHLORIDE 20 MEQ
40 PACKET (EA) ORAL EVERY 4 HOURS
Qty: 0 | Refills: 0 | Status: COMPLETED | OUTPATIENT
Start: 2019-02-11 | End: 2019-02-11

## 2019-02-11 RX ORDER — POTASSIUM CHLORIDE 20 MEQ
20 PACKET (EA) ORAL EVERY 4 HOURS
Qty: 0 | Refills: 0 | Status: COMPLETED | OUTPATIENT
Start: 2019-02-11 | End: 2019-02-11

## 2019-02-11 RX ORDER — INSULIN LISPRO 100/ML
6 VIAL (ML) SUBCUTANEOUS
Refills: 0 | Status: DISCONTINUED | OUTPATIENT
Start: 2019-02-11 | End: 2019-02-12

## 2019-02-11 RX ORDER — FUROSEMIDE 40 MG
20 TABLET ORAL DAILY
Qty: 0 | Refills: 0 | Status: DISCONTINUED | OUTPATIENT
Start: 2019-02-11 | End: 2019-02-12

## 2019-02-11 RX ORDER — ONDANSETRON 8 MG/1
4 TABLET, FILM COATED ORAL EVERY 8 HOURS
Qty: 0 | Refills: 0 | Status: DISCONTINUED | OUTPATIENT
Start: 2019-02-11 | End: 2019-02-12

## 2019-02-11 RX ORDER — INSULIN GLARGINE 100 [IU]/ML
15 INJECTION, SOLUTION SUBCUTANEOUS AT BEDTIME
Refills: 0 | Status: DISCONTINUED | OUTPATIENT
Start: 2019-02-11 | End: 2019-02-12

## 2019-02-11 RX ORDER — ONDANSETRON 8 MG/1
4 TABLET, FILM COATED ORAL EVERY 8 HOURS
Qty: 0 | Refills: 0 | Status: DISCONTINUED | OUTPATIENT
Start: 2019-02-11 | End: 2019-02-11

## 2019-02-11 RX ADMIN — Medication 40 MILLIEQUIVALENT(S): at 09:23

## 2019-02-11 RX ADMIN — Medication 1 TABLET(S): at 21:54

## 2019-02-11 RX ADMIN — Medication 40 MILLIEQUIVALENT(S): at 13:10

## 2019-02-11 RX ADMIN — Medication 20 MILLIEQUIVALENT(S): at 21:54

## 2019-02-11 RX ADMIN — Medication 8 UNIT(S): at 13:10

## 2019-02-11 RX ADMIN — HEPARIN SODIUM 5000 UNIT(S): 5000 INJECTION INTRAVENOUS; SUBCUTANEOUS at 13:10

## 2019-02-11 RX ADMIN — Medication 20 MILLIGRAM(S): at 14:58

## 2019-02-11 RX ADMIN — LORATADINE 10 MILLIGRAM(S): 10 TABLET ORAL at 13:10

## 2019-02-11 RX ADMIN — Medication 8 UNIT(S): at 09:22

## 2019-02-11 RX ADMIN — HEPARIN SODIUM 5000 UNIT(S): 5000 INJECTION INTRAVENOUS; SUBCUTANEOUS at 05:45

## 2019-02-11 RX ADMIN — HEPARIN SODIUM 5000 UNIT(S): 5000 INJECTION INTRAVENOUS; SUBCUTANEOUS at 21:54

## 2019-02-11 RX ADMIN — Medication 20 MILLIEQUIVALENT(S): at 18:15

## 2019-02-11 RX ADMIN — INSULIN GLARGINE 15 UNIT(S): 100 INJECTION, SOLUTION SUBCUTANEOUS at 21:53

## 2019-02-11 RX ADMIN — PANTOPRAZOLE SODIUM 40 MILLIGRAM(S): 20 TABLET, DELAYED RELEASE ORAL at 05:45

## 2019-02-11 RX ADMIN — Medication 2: at 13:10

## 2019-02-11 NOTE — CONSULT NOTE ADULT - SUBJECTIVE AND OBJECTIVE BOX
HPI: 73yFemale    Age at Dx:  How dx:  Hx and duration of insulin:  Current Therapy:  Hx of hypoglycemia  Hx of DKA/HHS?    Home FSG:  Fasting  Lunch  Dinner  Bed    Hx of other regimens  Complications:  Outpatient Endo:    PMH & Surgical Hx:WEAKNESS  Family history of influenza (Mother)  Family history of cerebrovascular accident (CVA) (Father)  Family history of obesity (Mother)  No pertinent family history in first degree relatives  No pertinent family history in first degree relatives  Handoff  MEWS Score  Essential hypertension  Obesity, unspecified classification, unspecified obesity type, unspecified whether serious comorbidity present  Sepsis, due to unspecified organism  Weakness  Nutrition, metabolism, and development symptoms  Acute diastolic congestive heart failure  Acute on chronic diastolic congestive heart failure  Bacteremia due to Escherichia coli  Chronic bilateral low back pain, with sciatica presence unspecified  Hyperglycemia  Urinary tract infection without hematuria, site unspecified  Transition of care performed with sharing of clinical summary  Essential hypertension  Hypokalemia  Lower extremity weakness  Respiratory distress  Severe sepsis  No significant past surgical history  WEAKNESS  Severe persistent reactive airway disease with acute exacerbation  Pneumonia of both lungs due to infectious organism, unspecified part of lung      FH:  DM:  Thyroid:  Autoimmune:  Other:    SH:  Smoking  Etoh:  Recreational Drugs:  Social Life:    Current Meds:  cefTRIAXone   IVPB 2 Gram(s) IV Intermittent every 24 hours  dextrose 40% Gel 15 Gram(s) Oral once PRN  dextrose 5%. 1000 milliLiter(s) IV Continuous <Continuous>  dextrose 50% Injectable 12.5 Gram(s) IV Push once  dextrose 50% Injectable 25 Gram(s) IV Push once  dextrose 50% Injectable 25 Gram(s) IV Push once  glucagon  Injectable 1 milliGRAM(s) IntraMuscular once PRN  heparin  Injectable 5000 Unit(s) SubCutaneous every 8 hours  insulin glargine Injectable (LANTUS) 24 Unit(s) SubCutaneous at bedtime  insulin lispro (HumaLOG) corrective regimen sliding scale   SubCutaneous Before meals and at bedtime  insulin lispro Injectable (HumaLOG) 8 Unit(s) SubCutaneous three times a day before meals  loratadine 10 milliGRAM(s) Oral daily  ondansetron Injectable 4 milliGRAM(s) IV Push every 8 hours PRN  pantoprazole    Tablet 40 milliGRAM(s) Oral before breakfast  potassium chloride    Tablet ER 40 milliEquivalent(s) Oral every 4 hours      Allergies:  No Known Allergies      ROS:  Denies the following except as indicated.    General: weight loss/weight gain, decreased appetite, fatigue  Eyes: Blurry vision, double vision, visual changes  ENT: Throat pain, changes in voice,   CV: palpitations, SOB, CP, cough  GI: NVD, difficulty swallowing, abdominal pain  : polyuria, dysuria  Endo: abnormal menses, temperature intolerance, decreased libido  MSK: weakness, joint pain  Skin: rash, dryness, diaphoresis  Heme: Easy bruising,bleeding  Neuro: HA, dizziness, lightheadedness, numbness tingling  Psych: Anxiety, Depression    Vital Signs Last 24 Hrs  T(C): 36.4 (11 Feb 2019 05:00), Max: 36.8 (10 Feb 2019 15:16)  T(F): 97.6 (11 Feb 2019 05:00), Max: 98.2 (10 Feb 2019 15:16)  HR: 71 (11 Feb 2019 05:00) (65 - 71)  BP: 146/84 (11 Feb 2019 05:00) (138/82 - 153/84)  BP(mean): --  RR: 16 (11 Feb 2019 05:00) (16 - 19)  SpO2: 97% (11 Feb 2019 05:00) (97% - 98%)  Height (cm): 167.64 (02-08 @ 21:56)  Weight (kg): 105 (02-08 @ 21:56)  BMI (kg/m2): 37.4 (02-08 @ 21:56)      Constitutional: wn/wd in NAD.   HEENT: NCAT, MMM, OP clear, EOMI, , no proptosis or lid retraction  Neck: no thyromegaly or palpable thyroid nodules   Respiratory: lungs CTAB.  Cardiovascular: regular rhythm, normal S1 and S2, no audible murmurs, no peripheral edema  GI: soft, NT/ND, no masses/HSM appreciated.  Neurology: no tremors, DTR 2+  Skin: no visible rashes/lesions  Psychiatric: AAO x 3, normal affect/mood.  Ext: radial pulses intact, DP pulses intact, extremities warm, no cyanosis, clubbing or edema.       LABS:                        11.6   28.28 )-----------( 120      ( 11 Feb 2019 05:36 )             35.0     02-11    139  |  101  |  37<H>  ----------------------------<  169<H>  3.1<L>   |  28  |  0.90    Ca    8.4      11 Feb 2019 05:36  Mg     2.2     02-11    TPro  6.9  /  Alb  2.2<L>  /  TBili  0.5  /  DBili  x   /  AST  25  /  ALT  42  /  AlkPhos  108  02-10        Hemoglobin A1C, Whole Blood: 6.6 (02-09 @ 06:39)    Thyroid Stimulating Hormone, Serum: 0.309 (02-08 @ 15:43)      RADIOLOGY & ADDITIONAL STUDIES:  CAPILLARY BLOOD GLUCOSE      POCT Blood Glucose.: 143 mg/dL (11 Feb 2019 08:21)  POCT Blood Glucose.: 145 mg/dL (11 Feb 2019 05:06)  POCT Blood Glucose.: 154 mg/dL (10 Feb 2019 22:01)  POCT Blood Glucose.: 201 mg/dL (10 Feb 2019 17:37)  POCT Blood Glucose.: 230 mg/dL (10 Feb 2019 12:19)        A/P:73y Female    1.  DM  Please continue lantus       units at night / morning.  Please continue lispro      units before each meal.  Please continue lispro moderate / low dose sliding scale four times daily with meals and at bedtime    Pt's fingerstick glucose goal is     Will continue to monitor     For discharge, pt can continue    Pt can follow up at discharge with [facility] by calling  to make an appointment.   Will discuss case with     and update primary team HPI: 74 yo F with a PMH of lumbar herniated discs, HTN, allergic sinusitis, b/l knee OA who initially presented from home w/ b/l LE weakness, found to be in respiratory distress, meeting severe sepsis criteria (T 103, WBC 34.96, 33% bands and Lactic acid of 2.5), UA grossly positive, and CXR with questionable R sided consolidation, treated with Cef/Azithro which was later dc'd, currenly being treated for severe sepsis 2/2 E coli bacteremia 2/2 UTI. On admission, pt was treated solumedrol and then continued on prednisone for respiratory distress 2/2 unknown cause. Pt was noted to be hyperglycemic despite discontinuation of prednisone and with increased insulin requirements. HbA1c of 6.6. Per pt, last A1c in September was 7.0        PMH & Surgical Hx:WEAKNESS  Family history of influenza (Mother)  Family history of cerebrovascular accident (CVA) (Father)  Family history of obesity (Mother)  No pertinent family history in first degree relatives  No pertinent family history in first degree relatives  Handoff  MEWS Score  Essential hypertension  Obesity, unspecified classification, unspecified obesity type, unspecified whether serious comorbidity present  Sepsis, due to unspecified organism  Weakness  Nutrition, metabolism, and development symptoms  Acute diastolic congestive heart failure  Acute on chronic diastolic congestive heart failure  Bacteremia due to Escherichia coli  Chronic bilateral low back pain, with sciatica presence unspecified  Hyperglycemia  Urinary tract infection without hematuria, site unspecified  Transition of care performed with sharing of clinical summary  Essential hypertension  Hypokalemia  Lower extremity weakness  Respiratory distress  Severe sepsis  No significant past surgical history  WEAKNESS  Severe persistent reactive airway disease with acute exacerbation  Pneumonia of both lungs due to infectious organism, unspecified part of lung      FH:  DM:  Thyroid:  Autoimmune:  Other:    SH:  Smoking  Etoh:  Recreational Drugs:  Social Life:    Current Meds:  cefTRIAXone   IVPB 2 Gram(s) IV Intermittent every 24 hours  dextrose 40% Gel 15 Gram(s) Oral once PRN  dextrose 5%. 1000 milliLiter(s) IV Continuous <Continuous>  dextrose 50% Injectable 12.5 Gram(s) IV Push once  dextrose 50% Injectable 25 Gram(s) IV Push once  dextrose 50% Injectable 25 Gram(s) IV Push once  glucagon  Injectable 1 milliGRAM(s) IntraMuscular once PRN  heparin  Injectable 5000 Unit(s) SubCutaneous every 8 hours  insulin glargine Injectable (LANTUS) 24 Unit(s) SubCutaneous at bedtime  insulin lispro (HumaLOG) corrective regimen sliding scale   SubCutaneous Before meals and at bedtime  insulin lispro Injectable (HumaLOG) 8 Unit(s) SubCutaneous three times a day before meals  loratadine 10 milliGRAM(s) Oral daily  ondansetron Injectable 4 milliGRAM(s) IV Push every 8 hours PRN  pantoprazole    Tablet 40 milliGRAM(s) Oral before breakfast  potassium chloride    Tablet ER 40 milliEquivalent(s) Oral every 4 hours      Allergies:  No Known Allergies      ROS:  Denies the following except as indicated.    General: weight loss/weight gain, decreased appetite, fatigue  Eyes: Blurry vision, double vision, visual changes  ENT: Throat pain, changes in voice,   CV: palpitations, SOB, CP, cough  GI: NVD, difficulty swallowing, abdominal pain  : polyuria, dysuria  Endo: abnormal menses, temperature intolerance, decreased libido  MSK: weakness, joint pain  Skin: rash, dryness, diaphoresis  Heme: Easy bruising,bleeding  Neuro: HA, dizziness, lightheadedness, numbness tingling  Psych: Anxiety, Depression    Vital Signs Last 24 Hrs  T(C): 36.4 (11 Feb 2019 05:00), Max: 36.8 (10 Feb 2019 15:16)  T(F): 97.6 (11 Feb 2019 05:00), Max: 98.2 (10 Feb 2019 15:16)  HR: 71 (11 Feb 2019 05:00) (65 - 71)  BP: 146/84 (11 Feb 2019 05:00) (138/82 - 153/84)  BP(mean): --  RR: 16 (11 Feb 2019 05:00) (16 - 19)  SpO2: 97% (11 Feb 2019 05:00) (97% - 98%)  Height (cm): 167.64 (02-08 @ 21:56)  Weight (kg): 105 (02-08 @ 21:56)  BMI (kg/m2): 37.4 (02-08 @ 21:56)      Constitutional: wn/wd in NAD.   HEENT: NCAT, MMM, OP clear, EOMI, , no proptosis or lid retraction  Neck: no thyromegaly or palpable thyroid nodules   Respiratory: lungs CTAB.  Cardiovascular: regular rhythm, normal S1 and S2, no audible murmurs, no peripheral edema  GI: soft, NT/ND, no masses/HSM appreciated.  Neurology: no tremors, DTR 2+  Skin: no visible rashes/lesions  Psychiatric: AAO x 3, normal affect/mood.  Ext: radial pulses intact, DP pulses intact, extremities warm, no cyanosis, clubbing or edema.       LABS:                        11.6   28.28 )-----------( 120      ( 11 Feb 2019 05:36 )             35.0     02-11    139  |  101  |  37<H>  ----------------------------<  169<H>  3.1<L>   |  28  |  0.90    Ca    8.4      11 Feb 2019 05:36  Mg     2.2     02-11    TPro  6.9  /  Alb  2.2<L>  /  TBili  0.5  /  DBili  x   /  AST  25  /  ALT  42  /  AlkPhos  108  02-10        Hemoglobin A1C, Whole Blood: 6.6 (02-09 @ 06:39)    Thyroid Stimulating Hormone, Serum: 0.309 (02-08 @ 15:43)      RADIOLOGY & ADDITIONAL STUDIES:  CAPILLARY BLOOD GLUCOSE      POCT Blood Glucose.: 143 mg/dL (11 Feb 2019 08:21)  POCT Blood Glucose.: 145 mg/dL (11 Feb 2019 05:06)  POCT Blood Glucose.: 154 mg/dL (10 Feb 2019 22:01)  POCT Blood Glucose.: 201 mg/dL (10 Feb 2019 17:37)  POCT Blood Glucose.: 230 mg/dL (10 Feb 2019 12:19)        A/P:73y Female    1.  DM  Please continue lantus       units at night / morning.  Please continue lispro      units before each meal.  Please continue lispro moderate / low dose sliding scale four times daily with meals and at bedtime    Pt's fingerstick glucose goal is     Will continue to monitor     For discharge, pt can continue    Pt can follow up at discharge with [facility] by calling  to make an appointment.   Will discuss case with     and update primary team HPI: 72 yo F with a PMH of lumbar herniated discs, HTN, allergic sinusitis, b/l knee OA who initially presented from home w/ b/l LE weakness, found to be in respiratory distress, meeting severe sepsis criteria (T 103, WBC 34.96, 33% bands and Lactic acid of 2.5), UA grossly positive, and CXR with questionable R sided consolidation, treated with Cef/Azithro which was later dc'd, currenly being treated for severe sepsis 2/2 E coli bacteremia 2/2 UTI. On admission, pt was treated solumedrol and then continued on prednisone for respiratory distress 2/2 unknown cause. Pt was noted to be hyperglycemic despite discontinuation of prednisone and with increased insulin requirements. HbA1c of 6.6. Per pt, last A1c in September was 7.0. Pt has never been on any medications for diabetes. She was told by her OP doctor to continue with lifestyle modifications.     Diabetic Hx: None    FH: CVA father  DM: Sister and brother  Thyroid: None   Autoimmune: None  Other:    SH:  Smoking None  Etoh: None  Recreational Drugs: None  Social Life: Lives at home alone    Current Meds:  cefTRIAXone   IVPB 2 Gram(s) IV Intermittent every 24 hours  dextrose 40% Gel 15 Gram(s) Oral once PRN  dextrose 5%. 1000 milliLiter(s) IV Continuous <Continuous>  dextrose 50% Injectable 12.5 Gram(s) IV Push once  dextrose 50% Injectable 25 Gram(s) IV Push once  dextrose 50% Injectable 25 Gram(s) IV Push once  glucagon  Injectable 1 milliGRAM(s) IntraMuscular once PRN  heparin  Injectable 5000 Unit(s) SubCutaneous every 8 hours  insulin glargine Injectable (LANTUS) 24 Unit(s) SubCutaneous at bedtime  insulin lispro (HumaLOG) corrective regimen sliding scale   SubCutaneous Before meals and at bedtime  insulin lispro Injectable (HumaLOG) 8 Unit(s) SubCutaneous three times a day before meals  loratadine 10 milliGRAM(s) Oral daily  ondansetron Injectable 4 milliGRAM(s) IV Push every 8 hours PRN  pantoprazole    Tablet 40 milliGRAM(s) Oral before breakfast  potassium chloride    Tablet ER 40 milliEquivalent(s) Oral every 4 hours      Allergies:  No Known Allergies    ROS:  Denies the following except as indicated.    General: positive for fatigue   Eyes: Blurry vision, double vision, visual changes  ENT: Throat pain, changes in voice,   CV: palpitations, SOB, CP, cough  GI: NVD, difficulty swallowing, abdominal pain  : positive for polyuria, negative for dysuria  Endo: abnormal menses, temperature intolerance, decreased libido  MSK: weakness, joint pain  Skin: rash, dryness, diaphoresis  Heme: Easy bruising, bleeding  Neuro: HA, dizziness, lightheadedness, numbness tingling  Psych: Anxiety, Depression    Vital Signs Last 24 Hrs  T(C): 36.4 (2019 05:00), Max: 36.8 (10 Feb 2019 15:16)  T(F): 97.6 (2019 05:00), Max: 98.2 (10 Feb 2019 15:16)  HR: 71 (2019 05:00) (65 - 71)  BP: 146/84 (2019 05:00) (138/82 - 153/84)  BP(mean): --  RR: 16 (2019 05:00) (16 - 19)  SpO2: 97% (2019 05:00) (97% - 98%)  Height (cm): 167.64 ( @ 21:56)  Weight (kg): 105 ( @ 21:56)  BMI (kg/m2): 37.4 ( @ 21:56)      Constitutional: wn/wd in NAD.   HEENT: NCAT, MMM, OP clear, EOMI, , no proptosis or lid retraction  Neck: no thyromegaly or palpable thyroid nodules   Respiratory: CTA bilaterally. No wheezing or crackles heard   Cardiovascular: regular rhythm, normal S1 and S2, no audible murmurs, no peripheral edema  GI: soft, NT/ND, no masses/HSM appreciated.  Neurology: no tremors, DTR 2+  Skin: no visible rashes/lesions  Psychiatric: AAO x 3, normal affect/mood.  Ext: radial pulses intact, DP pulses intact, extremities warm, no cyanosis, clubbing or edema.       LABS:                        11.6   28.28 )-----------( 120      ( 2019 05:36 )             35.0     02-11    139  |  101  |  37<H>  ----------------------------<  169<H>  3.1<L>   |  28  |  0.90    Ca    8.4      2019 05:36  Mg     2.2     02-    TPro  6.9  /  Alb  2.2<L>  /  TBili  0.5  /  DBili  x   /  AST  25  /  ALT  42  /  AlkPhos  108  -10        Hemoglobin A1C, Whole Blood: 6.6 ( @ 06:39)    Thyroid Stimulating Hormone, Serum: 0.309 ( @ 15:43)      RADIOLOGY & ADDITIONAL STUDIES:  CAPILLARY BLOOD GLUCOSE      POCT Blood Glucose.: 143 mg/dL (2019 08:21)  POCT Blood Glucose.: 145 mg/dL (2019 05:06)  POCT Blood Glucose.: 154 mg/dL (10 Feb 2019 22:01)  POCT Blood Glucose.: 201 mg/dL (10 Feb 2019 17:37)  POCT Blood Glucose.: 230 mg/dL (10 Feb 2019 12:19)    FSG & Insulin received:    Yesterday:  pre-dinner fs  6 nutritional lispro   units +  4 units lispro SS  bedtime fs  24 lantus   units +   2 units lispro SS    Today:  pre-breakfast fs  nutritional lispro 8  units  pre-lunch fs  nutritional lispro 8  units+  2 units lispro SS    A/P:72 yo F with a PMH of lumbar herniated discs, HTN, allergic sinusitis, b/l knee OA who initially presented from home w/ b/l LE weakness, found to be severely septic 2/2 E coli bacteremia 2/2 UTI. Pt initially treated with prednisone for respiratory distress which was later dc'd 2/2 hyperglycemia. Endocrine consulted for increased insulin requirements given no diabetic hx    1.  DM  A1c of 6.6  Please continue lantus       units at night / morning.  Please continue lispro      units before each meal.  Please continue lispro moderate / low dose sliding scale four times daily with meals and at bedtime    Pt's fingerstick glucose goal is     Will continue to monitor     For discharge, pt can continue    Pt can follow up at discharge with [facility] by calling  to make an appointment.   Will discuss case with     and update primary team HPI: 72 yo F with a PMH of lumbar herniated discs, HTN, allergic sinusitis, b/l knee OA who initially presented from home w/ b/l LE weakness, found to be in respiratory distress, meeting severe sepsis criteria (T 103, WBC 34.96, 33% bands and Lactic acid of 2.5), UA grossly positive, and CXR with questionable R sided consolidation, treated with Cef/Azithro which was later dc'd, currenly being treated for severe sepsis 2/2 E coli bacteremia 2/2 UTI. On admission, pt was treated solumedrol and then continued on prednisone for respiratory distress 2/2 unknown cause. Pt was noted to be hyperglycemic despite discontinuation of prednisone and with increased insulin requirements. HbA1c of 6.6. Per pt, last A1c in September was 7.0. Pt has never been on any medications for diabetes. She was told by her OP doctor to continue with lifestyle modifications.     Diabetic Hx: None    FH: CVA father  DM: Sister and brother  Thyroid: None   Autoimmune: None  Other:    SH:  Smoking None  Etoh: None  Recreational Drugs: None  Social Life: Lives at home alone    Current Meds:  cefTRIAXone   IVPB 2 Gram(s) IV Intermittent every 24 hours  dextrose 40% Gel 15 Gram(s) Oral once PRN  dextrose 5%. 1000 milliLiter(s) IV Continuous <Continuous>  dextrose 50% Injectable 12.5 Gram(s) IV Push once  dextrose 50% Injectable 25 Gram(s) IV Push once  dextrose 50% Injectable 25 Gram(s) IV Push once  glucagon  Injectable 1 milliGRAM(s) IntraMuscular once PRN  heparin  Injectable 5000 Unit(s) SubCutaneous every 8 hours  insulin glargine Injectable (LANTUS) 24 Unit(s) SubCutaneous at bedtime  insulin lispro (HumaLOG) corrective regimen sliding scale   SubCutaneous Before meals and at bedtime  insulin lispro Injectable (HumaLOG) 8 Unit(s) SubCutaneous three times a day before meals  loratadine 10 milliGRAM(s) Oral daily  ondansetron Injectable 4 milliGRAM(s) IV Push every 8 hours PRN  pantoprazole    Tablet 40 milliGRAM(s) Oral before breakfast  potassium chloride    Tablet ER 40 milliEquivalent(s) Oral every 4 hours      Allergies:  No Known Allergies    ROS:  Denies the following except as indicated.    General: positive for fatigue   Eyes: Blurry vision, double vision, visual changes  ENT: Throat pain, changes in voice,   CV: palpitations, SOB, CP, cough  GI: NVD, difficulty swallowing, abdominal pain  : positive for polyuria, negative for dysuria  Endo: abnormal menses, temperature intolerance, decreased libido  MSK: weakness, joint pain  Skin: rash, dryness, diaphoresis  Heme: Easy bruising, bleeding  Neuro: HA, dizziness, lightheadedness, numbness tingling  Psych: Anxiety, Depression    Vital Signs Last 24 Hrs  T(C): 36.4 (2019 05:00), Max: 36.8 (10 Feb 2019 15:16)  T(F): 97.6 (2019 05:00), Max: 98.2 (10 Feb 2019 15:16)  HR: 71 (2019 05:00) (65 - 71)  BP: 146/84 (2019 05:00) (138/82 - 153/84)  BP(mean): --  RR: 16 (2019 05:00) (16 - 19)  SpO2: 97% (2019 05:00) (97% - 98%)  Height (cm): 167.64 ( @ 21:56)  Weight (kg): 105 ( @ 21:56)  BMI (kg/m2): 37.4 ( @ 21:56)      Constitutional: wn/wd in NAD.   HEENT: NCAT, MMM, OP clear, EOMI, , no proptosis or lid retraction  Neck: no thyromegaly or palpable thyroid nodules   Respiratory: CTA bilaterally. No wheezing or crackles heard   Cardiovascular: regular rhythm, normal S1 and S2, no audible murmurs, no peripheral edema  GI: soft, NT/ND, no masses/HSM appreciated.  Neurology: no tremors, DTR 2+  Skin: no visible rashes/lesions  Psychiatric: AAO x 3, normal affect/mood.  Ext: radial pulses intact, DP pulses intact, extremities warm, no cyanosis, clubbing or edema.       LABS:                        11.6   28.28 )-----------( 120      ( 2019 05:36 )             35.0     02-11    139  |  101  |  37<H>  ----------------------------<  169<H>  3.1<L>   |  28  |  0.90    Ca    8.4      2019 05:36  Mg     2.2     02-    TPro  6.9  /  Alb  2.2<L>  /  TBili  0.5  /  DBili  x   /  AST  25  /  ALT  42  /  AlkPhos  108  -10        Hemoglobin A1C, Whole Blood: 6.6 ( @ 06:39)    Thyroid Stimulating Hormone, Serum: 0.309 ( @ 15:43)      RADIOLOGY & ADDITIONAL STUDIES:  CAPILLARY BLOOD GLUCOSE      POCT Blood Glucose.: 143 mg/dL (2019 08:21)  POCT Blood Glucose.: 145 mg/dL (2019 05:06)  POCT Blood Glucose.: 154 mg/dL (10 Feb 2019 22:01)  POCT Blood Glucose.: 201 mg/dL (10 Feb 2019 17:37)  POCT Blood Glucose.: 230 mg/dL (10 Feb 2019 12:19)    FSG & Insulin received:    Yesterday:  pre-dinner fs  6 nutritional lispro   units +  4 units lispro SS  bedtime fs  24 lantus   units +   2 units lispro SS    Today:  pre-breakfast fs  nutritional lispro 8  units  pre-lunch fs  nutritional lispro 8  units+  2 units lispro SS    A/P:72 yo F with a PMH of lumbar herniated discs, HTN, allergic sinusitis, b/l knee OA who initially presented from home w/ b/l LE weakness, found to be severely septic 2/2 E coli bacteremia 2/2 UTI. Pt initially treated with prednisone for respiratory distress which was later dc'd 2/2 hyperglycemia. Endocrine consulted for increased insulin requirements given no diabetic hx    1. Type 2 DM  A1c of 6.6 on this admission. A1c of 7 in September   Pt noted to be hyperglycemic with increased insulin requirements most likely due to steroid use as well as sepsis. Glucose has been 140-160 today and well controlled.   Decrease lantus to 15 units at night.  Decrease lispro to 6 units before each meal.  Please continue lispro moderate dose sliding scale four times daily with meals and at bedtime. Pt will most likely need decrease in insulin tomorrow. Will continue to follow along.     Case discussed with Dr. Olson and primary team.

## 2019-02-11 NOTE — PROGRESS NOTE ADULT - PROBLEM SELECTOR PLAN 2
Patient presenting meeting severe sepsis criteria, found to have E. coli bacteremia, likely 2/2 UTI given UA positive on admission  -BCx growing pan-sensitive E. coli   -c/w Bactrim 160-800mg BID (2/11- )  -management as above    #UTI  UA positive with large LE, positive Nitrites, many WBC and bacteria  -management as above

## 2019-02-11 NOTE — PROGRESS NOTE ADULT - SUBJECTIVE AND OBJECTIVE BOX
VITAL SIGNS:  Vital Signs Last 24 Hrs  T(C): 36.4 (11 Feb 2019 05:00), Max: 36.8 (10 Feb 2019 15:16)  T(F): 97.6 (11 Feb 2019 05:00), Max: 98.2 (10 Feb 2019 15:16)  HR: 71 (11 Feb 2019 05:00) (65 - 71)  BP: 146/84 (11 Feb 2019 05:00) (138/82 - 153/84)  BP(mean): --  RR: 16 (11 Feb 2019 05:00) (16 - 19)  SpO2: 97% (11 Feb 2019 05:00) (97% - 98%)    PHYSICAL EXAM:    General: in NAD, lying comfortably in bed  HEENT: normocephalic, atraumatic; PERRL, anicteric sclera; MMM  Neck: supple, no JVD, no thyromegaly, no lymphadenopathy  Cardiovascular: +S1/S2, RRR, no M/G/R  Respiratory: clear to auscultation B/L; no wheezing, no rales, no rhonchi  Gastrointestinal: soft, NT/ND; +BSx4, no organomegaly  Extremities: WWP; no edema, clubbing or cyanosis  Vascular: 2+ radial, DP/PT pulses B/L  Neurological: AAOx3; no focal deficits    MEDICATIONS:  MEDICATIONS  (STANDING):  cefTRIAXone   IVPB 2 Gram(s) IV Intermittent every 24 hours  dextrose 5%. 1000 milliLiter(s) (50 mL/Hr) IV Continuous <Continuous>  dextrose 50% Injectable 12.5 Gram(s) IV Push once  dextrose 50% Injectable 25 Gram(s) IV Push once  dextrose 50% Injectable 25 Gram(s) IV Push once  heparin  Injectable 5000 Unit(s) SubCutaneous every 8 hours  insulin glargine Injectable (LANTUS) 24 Unit(s) SubCutaneous at bedtime  insulin lispro (HumaLOG) corrective regimen sliding scale   SubCutaneous Before meals and at bedtime  insulin lispro Injectable (HumaLOG) 8 Unit(s) SubCutaneous three times a day before meals  loratadine 10 milliGRAM(s) Oral daily  pantoprazole    Tablet 40 milliGRAM(s) Oral before breakfast  potassium chloride    Tablet ER 40 milliEquivalent(s) Oral every 4 hours    MEDICATIONS  (PRN):  dextrose 40% Gel 15 Gram(s) Oral once PRN Blood Glucose LESS THAN 70 milliGRAM(s)/deciliter  glucagon  Injectable 1 milliGRAM(s) IntraMuscular once PRN Glucose LESS THAN 70 milligrams/deciliter  ondansetron Injectable 4 milliGRAM(s) IV Push every 8 hours PRN Nausea and/or Vomiting      ALLERGIES:  Allergies    No Known Allergies    Intolerances        LABS:                        11.6   28.28 )-----------( 120      ( 11 Feb 2019 05:36 )             35.0     02-11    139  |  101  |  37<H>  ----------------------------<  169<H>  3.1<L>   |  28  |  0.90    Ca    8.4      11 Feb 2019 05:36  Mg     2.2     02-11    TPro  6.9  /  Alb  2.2<L>  /  TBili  0.5  /  DBili  x   /  AST  25  /  ALT  42  /  AlkPhos  108  02-10        CAPILLARY BLOOD GLUCOSE      POCT Blood Glucose.: 143 mg/dL (11 Feb 2019 08:21)      RADIOLOGY & ADDITIONAL TESTS: Reviewed. Interval HPI/overnight events: overnight patient with one episode of bilious emesis, reports that she was trying to moisten her tongue with a towel and gagged. This am, patient seen using the commode no acute complaints. Reports that her breathing has improved. Denies fever, chills, abdominal pain, nausea/vomiting, diarrhea, constipation.    VITAL SIGNS:  Vital Signs Last 24 Hrs  T(C): 36.4 (11 Feb 2019 05:00), Max: 36.8 (10 Feb 2019 15:16)  T(F): 97.6 (11 Feb 2019 05:00), Max: 98.2 (10 Feb 2019 15:16)  HR: 71 (11 Feb 2019 05:00) (65 - 71)  BP: 146/84 (11 Feb 2019 05:00) (138/82 - 153/84)  BP(mean): --  RR: 16 (11 Feb 2019 05:00) (16 - 19)  SpO2: 97% (11 Feb 2019 05:00) (97% - 98%)    PHYSICAL EXAM:  General: elderly female, in NAD, on the commode  HEENT: normocephalic, atraumatic; PERRL, anicteric sclera; MMM  Neck: supple, no JVD  Cardiovascular: +S1/S2, RRR, no M/G/R  Respiratory: good air entry, bibasilar crackles; no wheezing, no rales, no rhonchi  Gastrointestinal: soft, NT/ND; +BSx4, no organomegaly  Extremities: WWP; 2+ pedal pitting edema, no clubbing or cyanosis  Vascular: 2+ radial, DP/PT pulses B/L  Neurological: AAOx3; CN II-XII grossly intact, strength 5/5 b/l UE and LE, no focal deficits    MEDICATIONS:  MEDICATIONS  (STANDING):  cefTRIAXone   IVPB 2 Gram(s) IV Intermittent every 24 hours  dextrose 5%. 1000 milliLiter(s) (50 mL/Hr) IV Continuous <Continuous>  dextrose 50% Injectable 12.5 Gram(s) IV Push once  dextrose 50% Injectable 25 Gram(s) IV Push once  dextrose 50% Injectable 25 Gram(s) IV Push once  heparin  Injectable 5000 Unit(s) SubCutaneous every 8 hours  insulin glargine Injectable (LANTUS) 24 Unit(s) SubCutaneous at bedtime  insulin lispro (HumaLOG) corrective regimen sliding scale   SubCutaneous Before meals and at bedtime  insulin lispro Injectable (HumaLOG) 8 Unit(s) SubCutaneous three times a day before meals  loratadine 10 milliGRAM(s) Oral daily  pantoprazole    Tablet 40 milliGRAM(s) Oral before breakfast  potassium chloride    Tablet ER 40 milliEquivalent(s) Oral every 4 hours    MEDICATIONS  (PRN):  dextrose 40% Gel 15 Gram(s) Oral once PRN Blood Glucose LESS THAN 70 milliGRAM(s)/deciliter  glucagon  Injectable 1 milliGRAM(s) IntraMuscular once PRN Glucose LESS THAN 70 milligrams/deciliter  ondansetron Injectable 4 milliGRAM(s) IV Push every 8 hours PRN Nausea and/or Vomiting      ALLERGIES:  Allergies    No Known Allergies    Intolerances        LABS:                        11.6   28.28 )-----------( 120      ( 11 Feb 2019 05:36 )             35.0     02-11    139  |  101  |  37<H>  ----------------------------<  169<H>  3.1<L>   |  28  |  0.90    Ca    8.4      11 Feb 2019 05:36  Mg     2.2     02-11    TPro  6.9  /  Alb  2.2<L>  /  TBili  0.5  /  DBili  x   /  AST  25  /  ALT  42  /  AlkPhos  108  02-10      POCT Blood Glucose.: 143 mg/dL (11 Feb 2019 08:21)    RADIOLOGY & ADDITIONAL TESTS: Reviewed.

## 2019-02-11 NOTE — CONSULT NOTE ADULT - SUBJECTIVE AND OBJECTIVE BOX
Patient is a 73y old  Female who presents with a chief complaint of Weakness (11 Feb 2019 11:57)       HPI:  72 y/o F w/ PMHx notable for lumbar herniated discs, HTN, allergic sinusitis (takes claritin), b/l knee OA who initially presented from home w/ b/l LE weakness. Patient reports that this morning she woke up at around 4am and while in the bathroom patient reports that she was unable to stand from toilet. Patient reported that she was stuck on the commode for at least 8 hours before her  called EMS. Patient denies any associated chest pain, palpitations, headache, dizziness, shortness of breath, cough, nausea, vomiting, fever/chills, dysuria, numbness, urinary incontinence, numbness/tingling. No loss of consciousness no recent travel or sick contacts.     Upon arrival pt w/ T: 98.8, HR: 86, BP: 159/80, RR:21, O2: 97% on room air.   Labs notable for WBC of 34.96 with 33% bands, K 2.7, Cr 1.05, TSH 0.309,   Patient was evaluated by neurology and had Lumbar MRI which was negative for any signs of cord compression.   Patient was given Potassium 40meq x2    When patient was evaluated at bedside she was noted to have significant audible wheezing and difficulty speaking in full sentences. Patient denied any history of asthma, copd or tobacco use. No inhaler use at home. Patient was given Duonebs x3, Solumedrol 125mg, Mg 2g.   Repeat vitals notable for Tmax 103.2 (rectal), HR 86, /64, RR: 32 O2: 96% on 8L via aerosol mask.   Blood cultures, RVP, ABG obtained and pt given Tylenol 975, 1L NS, Cef + Azithromycin. (08 Feb 2019 19:22)      PAST MEDICAL & SURGICAL HISTORY:  Essential hypertension  Obesity, unspecified classification, unspecified obesity type, unspecified whether serious comorbidity present  No significant past surgical history      MEDICATIONS  (STANDING):  dextrose 5%. 1000 milliLiter(s) (50 mL/Hr) IV Continuous <Continuous>  dextrose 50% Injectable 12.5 Gram(s) IV Push once  dextrose 50% Injectable 25 Gram(s) IV Push once  dextrose 50% Injectable 25 Gram(s) IV Push once  furosemide    Tablet 20 milliGRAM(s) Oral daily  heparin  Injectable 5000 Unit(s) SubCutaneous every 8 hours  insulin glargine Injectable (LANTUS) 24 Unit(s) SubCutaneous at bedtime  insulin lispro (HumaLOG) corrective regimen sliding scale   SubCutaneous Before meals and at bedtime  insulin lispro Injectable (HumaLOG) 8 Unit(s) SubCutaneous three times a day before meals  loratadine 10 milliGRAM(s) Oral daily  pantoprazole    Tablet 40 milliGRAM(s) Oral before breakfast  potassium chloride    Tablet ER 40 milliEquivalent(s) Oral every 4 hours  trimethoprim  160 mG/sulfamethoxazole 800 mG 1 Tablet(s) Oral every 12 hours    MEDICATIONS  (PRN):  dextrose 40% Gel 15 Gram(s) Oral once PRN Blood Glucose LESS THAN 70 milliGRAM(s)/deciliter  glucagon  Injectable 1 milliGRAM(s) IntraMuscular once PRN Glucose LESS THAN 70 milligrams/deciliter  ondansetron Injectable 4 milliGRAM(s) IV Push every 8 hours PRN Nausea and/or Vomiting      Social History: lives alone in a walkup apartment, 2 flights to enter apartment, no home care services    Functional Level Prior to Admission: generally ADL independent, walks without assistive devices    FAMILY HISTORY:  Family history of influenza (Mother)  Family history of cerebrovascular accident (CVA) (Father)  Family history of obesity (Mother)      CBC Full  -  ( 11 Feb 2019 05:36 )  WBC Count : 28.28 K/uL  Hemoglobin : 11.6 g/dL  Hematocrit : 35.0 %  Platelet Count - Automated : 120 K/uL  Mean Cell Volume : 86.0 fl  Mean Cell Hemoglobin : 28.5 pg  Mean Cell Hemoglobin Concentration : 33.1 gm/dL  Auto Neutrophil # : x  Auto Lymphocyte # : x  Auto Monocyte # : x  Auto Eosinophil # : x  Auto Basophil # : x  Auto Neutrophil % : x  Auto Lymphocyte % : x  Auto Monocyte % : x  Auto Eosinophil % : x  Auto Basophil % : x      02-11    139  |  101  |  37<H>  ----------------------------<  169<H>  3.1<L>   |  28  |  0.90    Ca    8.4      11 Feb 2019 05:36  Mg     2.2     02-11    TPro  6.9  /  Alb  2.2<L>  /  TBili  0.5  /  DBili  x   /  AST  25  /  ALT  42  /  AlkPhos  108  02-10            Radiology:    < from: MR Lumbar Spine No Cont (02.08.19 @ 17:22) >  EXAM:  MR SPINE LUMBAR                          PROCEDURE DATE:  02/08/2019          INTERPRETATION:  I, Radha Guerra MD, have reviewed the images and the report   and agree with the findings with the additional modification: There is   increased posterior epidural fat consistent with a component of mild   epidural lipomatosis at L3-L5.    At the L5/S1 level there is a very minimal disc bulge contacting the   ventral thecal sac. The bilateral neuroforamen are patent. The bilateral   L5 exiting nerve roots are within normal limits. There is moderate facet   hypertrophy. There is a small amount of fluid seen in the facet joint.   There is no evidence of spinal canal stenosis.    At the L4/L5 level there is a mild diffuse disc bulge flattening the   ventral thecal sac, mildly contacting the bilateral distal L4 foraminal   nerve roots and contacting the left and in close proximity to the right   descending L5 nerve roots. There is moderate bilateral foraminal   narrowing. The bilateral L4 foraminal nerve roots are within normal   limits. There is moderate facet and ligamentous hypertrophy. The   constellation of findings is causing mild spinal canal stenosis.    At the L3/L4 level there is a mild diffuse disc bulge flattening the   ventralthecal sac and in close proximity to the bilateral distal   foraminal L3 exiting nerve roots. There is mild bilateral foraminal   narrowing. There is mild to moderate facet and ligamentous hypertrophy.   There is no evidence of spinal canal stenosis.    The remaining levels demonstrate no evidence of focal disc herniation or   spinal canal stenosis. The bilateral neuroforamen are patent.    PROCEDURE: MRI of the lumbosacral spine without contrast    INDICATION: Lower extremity weakness, right worse than left. Known   herniated disc.    TECHNIQUE: Sagittal T1, T2, STIR, axial T1, T2 and coronal T2 weighted   images of the lumbosacral spine were obtained.     COMPARISON: MRI of the lumbar spine 2/17/2005.    FINDINGS: The MRI exam demonstrates the lumbosacral alignment to be   intact. The vertebral body heights and intervertebral disc spaces are   maintained. The vertebral body marrow signal is appropriate for the   patient's stated age. The conus medullaris ends at T12/L1.     At the L1/2 level, there is no disc herniation. There is no spinal canal   stenosis or neural foramen narrowing.    At the L2/3 level, there is no disc herniation. There is no spinal canal   stenosis or neural foramen narrowing.    At the L3/4 level, there is mild diffuse disc bulge without central   spinal canal stenosis. Disc bulge intravenous to mild right neural   foramen narrowing at this level which may contact the exiting right L3   nerve roots.    At the L4/5 level, there is moderate diffuse disc bulge which is slightly   asymmetric to the left without central spinal canal stenosis. There is   mild to moderate bilateral neural foramen narrowing at this level, left   greater than right, secondary to disc bulge and ligamentum flavum   hypertrophy.    At the L5/S1 level, there is mild broad-based central disc herniation,   slightly asymmetric to the left. There is no spinal canal stenosis. Mild   left neural foramen narrowing.      IMPRESSION:   1.  No cord compression.  2.  Small disc herniations at L3-4, L4-5, and L5-S1.              Vital Signs Last 24 Hrs  T(C): 36.4 (11 Feb 2019 05:00), Max: 36.8 (10 Feb 2019 15:16)  T(F): 97.6 (11 Feb 2019 05:00), Max: 98.2 (10 Feb 2019 15:16)  HR: 71 (11 Feb 2019 05:00) (65 - 71)  BP: 146/84 (11 Feb 2019 05:00) (138/82 - 153/84)  BP(mean): --  RR: 16 (11 Feb 2019 05:00) (16 - 19)  SpO2: 97% (11 Feb 2019 05:00) (97% - 98%)    REVIEW OF SYSTEMS:    CONSTITUTIONAL:fatigue  EYES: No eye pain, visual disturbances, or discharge  ENMT:  No difficulty hearing, tinnitus, vertigo; No sinus or throat pain  NECK: No pain or stiffness  BREASTS: No pain, masses, or nipple discharge  RESPIRATORY: No cough, wheezing, chills or hemoptysis; No shortness of breath  CARDIOVASCULAR: No chest pain, palpitations, dizziness, or leg swelling  GASTROINTESTINAL: No abdominal or epigastric pain. No nausea, vomiting, or hematemesis; No diarrhea or constipation. No melena or hematochezia.  GENITOURINARY: No dysuria, frequency, hematuria, or incontinence  NEUROLOGICAL: leg weakness  SKIN: No itching, burning, rashes, or lesions   LYMPH NODES: No enlarged glands  ENDOCRINE: No heat or cold intolerance; No hair loss  MUSCULOSKELETAL: low back pain  PSYCHIATRIC: No depression, anxiety, mood swings, or difficulty sleeping  HEME/LYMPH: No easy bruising, or bleeding gums  ALLERGY AND IMMUNOLOGIC: No hives or eczema  VASCULAR: no swelling, erythema      Physical Exam: obese 72 yo AA woman lying in semi Yuan's position, c/o weakness    Head: normocephalic, atraumatic    Eyes: PERRLA, EOMI, no nystagmus, sclera anicteric    ENT: nasal discharge, uvula midline, no oropharyngeal erythema/exudate    Neck: supple, negative JVD, negative carotid bruits, no thyromegaly    Chest: CTA bilaterally, neg wheeze, rhonchi, rales, crackles, egophany    Cardiovascular: regular rate and rhythm, neg murmurs/rubs/gallops    Abdomen: soft, non distended, non tender, negative rebound/guarding, normal bowel sounds, neg hepatosplenomegaly    Extremities: WWP, neg cyanosis/clubbing/edema, negative calf tenderness to palpation, negative Whitney's sign    :     Neurologic Exam:    Alert and oriented to person, place, date/year, speech fluent w/o dysarthria, recent and remote memory intact, repetition intact, comprehension intact,     Cranial Nerves:     II:                       pupils equal, round and reactive to light, visual fields intact   III/ IV/VI:            extraocular movements intact, neg nystagmus, ptosis  V:                       facial sensation intact, V1-3 normal  VII:                     face symmetric, no droop, normal eye closure and smile  VIII:                    hearing intact to finger rub bilaterally  IX/ X:                 soft palate rise symmetrical  XI:                      head turning, shoulder shrug normal  XII:                     tongue midline    Motor Exam:    Upper Extremities:     RIght:   5/5   /intrinsics               5/5  wrist flexors/extensors               5/5  biceps/triceps               5/5  deltoid               negative drift    Left :     5/5  /intrinsics               5/5  wrist flexors/extensors               5/5 biceps/triceps               5/5 deltoid               negative drift    Lower Extremities:                 Right:     4/5  DF/PF/ evertors/ invertors                4/5  Quad/ hamstrings                3+/5  hip flexors/adductors/abductors                 Left:       4/5  DF/PF/ evertors/ invertors                4/5  Quad/ hamstrings                3+/5  hip flexors/adductors/abductors      Sensory:    intact to LT/PP in all UE/LE dermatomes    DTR:            = biceps/     triceps/     brachioradialis                      = patella/   medial hamstring/ankle                      neg clonus                      neg Babinski                      neg Hoffmans    Romberg:  not tested    Tandem Walking:  not tested    Gait:  not tested        PM&R Impression:    1) deconditioned  2) gait dysfunction  3) lumbar myofascial pain/ DDD w/o cord compression        Recommendations:    1) Physical therapy focusing on therapeutic exercises, bed mobility/transfer out of bed evaluation, progressive ambulation with assistive devices prn.    2) Anticipated Disposition Plan/Recs: subacute rehab placement

## 2019-02-11 NOTE — PROGRESS NOTE ADULT - ASSESSMENT
IMPRESSION & RECOMMENDATIONS:    73 F PMH lumbar herniated disc, knee OA b/l, presenting from home for progressive R>L LE weakness. Acute on chronic weakness in setting of e coli bacteremia. Chronic weakness can be evaluated outpatient after resolution of acute infection.  Suspect LMN or muscular etiology diminished LE reflexes), would benefit from EMG outpatient.  - Follow up with Neuromuscular 1-2 weeks after discharge    Please re-consult with any additional questions.    Thank you for the opportunity to participate in the care of this patient.

## 2019-02-11 NOTE — CONSULT NOTE ADULT - ASSESSMENT
74 yo F with a PMH of lumbar herniated discs, HTN, allergic sinusitis (takes claritin), b/l knee OA who initially presented from home w/ b/l LE weakness, found to be in respiratory distress, meeting severe sepsis criteria (T 103, WBC 34.96, 33% bands and Lactic acid of 2.5), UA grossly positive, and CXR with questionable R sided consolidation, treated with Cef/Azithro, now with BCx growing E.coli    Problem/Plan - 1:  ·  Problem: Severe sepsis.  Plan: 2/2 E.coli bacteremia 2/2 UTI. Patient presenting meeting severe sepsis (T 103, WBC 34.96, 33% bands and Lactic acid of 2.5). CXR with potential small right sided consolidation. RVP negative. UA positive. s/p 1.5L NS bolus, Ceft/Azithro x 1 in the ED.   -BCx: gram negative rods-E.coli, f/u sensitivities  -f/u surveillance cultures  -UCx: mixed growth  -c/w Ceftriaxone 2g Q24h (2/10- ).     Problem/Plan - 2:  ·  Problem: Bacteremia due to Escherichia coli.  Plan: Patient presenting meeting severe sepsis criteria, found to have E. coli bacteremia, likely 2/2 UTI given UA positive on admission  -started on Ceftriaxone 2g Q24hrs (2/10- )  -management as above    #UTI  UA positive with large LE, positive Nitrites, many WBC and bacteria  -management as above.     Problem/Plan - 3:  ·  Problem: Acute diastolic congestive heart failure.  Plan: Patient with no hx of CHF, presented with w/ acute change in respiratory status with audible wheezing that improved s/p Duonebs x3, Solumedrol 125mg x1 and Mg 2g. Found to have elevated BNP >2000, b/l LE edema  -ABG consistent with respiratory alkalosis   -Duonebs q6hrs standing   -ECHO: EF 60%, mildly dilated RA, moderate AR, mild MR, sPAP 41mmHg  -CXR with congestion  -If resp distress will consider Lasix 20mg x 1.     Problem/Plan - 4:  ·  Problem: Respiratory distress.  Plan: Resolved. Patient w/ acute change in respiratory status with audible wheezing that improved s/p Duonebs x3, Solumedrol 125mg x1 and Mg 2g. No reported hx of Asthma, COPD or Tobacco use. Etiology of reactive airway likely secondary to infectious lung process vs bronchospasm of unknown etiology vs acute CHF given pt w/ b/l LE edema and BNP >2000  -ABG consistent with respiratory alkalosis   -Duonebs q6hrs standing   -Prednisone d/c'ed given patient with uncontrolled FSG  -echo w/ dCHF, edema on exam, given sepsis cautious w/ IVF  - will consider lasix if in resp distress  - c/w Nasal canula, currently on 4L, will try to wean off.     Problem/Plan - 5:  ·  Problem: Lower extremity weakness.  Plan: Patient presented w/ b/l LE weakness and difficulty standing from commode. Patient w/ chronic lower back pain for which MRI was negative for acute signs of cord compression and only notable for disc protrusion in L3-L4, L4-L5, L5-S1. Xray lumbar spine normal. Etiology likely in the setting of infection vs. electrolyte/metabolic given pt w/ hypokalemia on admission and elevated CK of 246 vs. myopathy.   -Neurology following --appreciate recs   -MRI L-spine: no cord compression  -f/u MRI C and T-spine  -Neurology recommend EMG as outpatient  -TSH: 0.309, free T4 0.92, ESR 93  -f/u Aldolase, B12, folate, RPR    #Chronic b/l lower back pain  -pain medications prn  -follow up MRI C and T spine per neuro recs  -management as above.

## 2019-02-11 NOTE — PROGRESS NOTE ADULT - PROBLEM SELECTOR PLAN 5
Patient presented w/ b/l LE weakness and difficulty standing from commode. Patient w/ chronic lower back pain for which MRI was negative for acute signs of cord compression and only notable for disc protrusion in L3-L4, L4-L5, L5-S1. Xray lumbar spine normal. Etiology likely in the setting of infection vs. electrolyte/metabolic given pt w/ hypokalemia on admission and elevated CK of 246 vs. myopathy.   -Neurology following --appreciate recs   -MRI L-spine: no cord compression  -Neurology recommend EMG as outpatient  -TSH: 0.309, free T4 0.92, ESR 93  -B12 >2000, Folate 13.6  -f/u Aldolase, RPR    #Chronic b/l lower back pain  -pain medications prn  -follow up MRI C and T spine per neuro recs  -management as above

## 2019-02-11 NOTE — PROGRESS NOTE ADULT - ASSESSMENT
72 yo F with a PMH of lumbar herniated discs, HTN, allergic sinusitis (takes claritin), b/l knee OA who initially presented from home w/ b/l LE weakness, found to be in respiratory distress, meeting severe sepsis criteria (T 103, WBC 34.96, 33% bands and Lactic acid of 2.5), UA grossly positive, and CXR with questionable R sided consolidation, treated with Cef/Azithro, BCx growing E.coli, started on Ceftriaxone 2g qd

## 2019-02-11 NOTE — PROGRESS NOTE ADULT - PROBLEM SELECTOR PLAN 8
Patient w/ history of HTN but unsure of which medications she takes. As per outpatient records patient on Norvasc, HCTZ and Atenolol at home.   -will hold for now given severe sepsis picture

## 2019-02-11 NOTE — PROGRESS NOTE ADULT - PROBLEM SELECTOR PLAN 7
Patient w/ potassium of 2.7 upon admission.   -S/p 40meq PO x2 in the ED, repeat BMP w/ K of 2.9, s/p 10meq x3  -K 3.1 on 2/11, given 40mEq x 3  -replete PRN

## 2019-02-11 NOTE — PROGRESS NOTE ADULT - PROBLEM SELECTOR PLAN 9
F: none  E: replete PRN  D: DASH/TLC    DVT ppx: Heparin 5000u SQ Q8h  GI ppx: Protonix 40mg qd    Dispo: [facility]  Code: Full

## 2019-02-11 NOTE — PROGRESS NOTE ADULT - SUBJECTIVE AND OBJECTIVE BOX
OVERNIGHT EVENTS: MONTEZ    SUBJECTIVE / INTERVAL HPI: Patient seen and examined at bedside. C/o persistent LE weakness especially in standing.    VITAL SIGNS:  Vital Signs Last 24 Hrs  T(C): 36.4 (11 Feb 2019 05:00), Max: 36.8 (10 Feb 2019 21:10)  T(F): 97.6 (11 Feb 2019 05:00), Max: 98.2 (10 Feb 2019 21:10)  HR: 71 (11 Feb 2019 05:00) (68 - 71)  BP: 146/84 (11 Feb 2019 05:00) (138/82 - 146/84)  BP(mean): --  RR: 16 (11 Feb 2019 05:00) (16 - 18)  SpO2: 97% (11 Feb 2019 05:00) (97% - 98%)    PHYSICAL EXAM:    Constitutional: obese; NAD  Eyes: Conjunctiva and sclera clear.  Cardiovascular: Regular rate and rhythm; S1 and S2 Ashley  Neurologic:  - Mental Status:  AAOx3; speech is fluent with intact naming, repetition, and comprehension; Good overall fund of knowledge.  - Cranial Nerves II-XII:    II:  Visual fields are full to confrontation; Pupils are equal, round, and reactive to light.  III, IV, VI:  Extraocular movements are intact without nystagmus.  V:  Facial sensation is intact in the V1-V3 distribution bilaterally.  VII:  Face is symmetric with normal eye closure and smile  VIII:  Hearing is intact to finger rub.  IX, X:  Uvula is midline and soft palate rises symmetrically  XI:  Head turning and shoulder shrug are intact.  XII:  Tongue protrudes in the midline.  - Motor:  Strength is 4/5 b/l LE, L>R.  Normal muscle bulk and tone throughout.  - Reflexes:  2+ and symmetric at the biceps, triceps, brachioradialis, 1+ knees, and ankles.  Babinski downgoing.  - Sensory:  Intact to light touch  - Gait:  Deferred    MEDICATIONS:  MEDICATIONS  (STANDING):  dextrose 5%. 1000 milliLiter(s) (50 mL/Hr) IV Continuous <Continuous>  dextrose 50% Injectable 12.5 Gram(s) IV Push once  dextrose 50% Injectable 25 Gram(s) IV Push once  dextrose 50% Injectable 25 Gram(s) IV Push once  furosemide    Tablet 20 milliGRAM(s) Oral daily  heparin  Injectable 5000 Unit(s) SubCutaneous every 8 hours  insulin glargine Injectable (LANTUS) 24 Unit(s) SubCutaneous at bedtime  insulin lispro (HumaLOG) corrective regimen sliding scale   SubCutaneous Before meals and at bedtime  insulin lispro Injectable (HumaLOG) 8 Unit(s) SubCutaneous three times a day before meals  loratadine 10 milliGRAM(s) Oral daily  pantoprazole    Tablet 40 milliGRAM(s) Oral before breakfast  potassium chloride    Tablet ER 40 milliEquivalent(s) Oral every 4 hours  trimethoprim  160 mG/sulfamethoxazole 800 mG 1 Tablet(s) Oral every 12 hours    MEDICATIONS  (PRN):  dextrose 40% Gel 15 Gram(s) Oral once PRN Blood Glucose LESS THAN 70 milliGRAM(s)/deciliter  glucagon  Injectable 1 milliGRAM(s) IntraMuscular once PRN Glucose LESS THAN 70 milligrams/deciliter  ondansetron Injectable 4 milliGRAM(s) IV Push every 8 hours PRN Nausea and/or Vomiting      ALLERGIES:  Allergies    No Known Allergies    Intolerances      LABS:                        11.6   28.28 )-----------( 120      ( 11 Feb 2019 05:36 )             35.0     02-11    139  |  101  |  37<H>  ----------------------------<  169<H>  3.1<L>   |  28  |  0.90    Ca    8.4      11 Feb 2019 05:36  Mg     2.2     02-11    TPro  6.9  /  Alb  2.2<L>  /  TBili  0.5  /  DBili  x   /  AST  25  /  ALT  42  /  AlkPhos  108  02-10    CAPILLARY BLOOD GLUCOSE  POCT Blood Glucose.: 163 mg/dL (11 Feb 2019 12:43)      RADIOLOGY & ADDITIONAL TESTS: Reviewed.

## 2019-02-11 NOTE — CONSULT NOTE ADULT - ATTENDING COMMENTS
Pt seen on rounds this afternoon and events of the weekend reviewed.  Was "officially" diabetic as of last September, when A1c of 7.0% was noted, but she was not apparently told of diabetes at that time.  She has been careful with her diet, however, and current A1c level is down to 6.7%.  Her hyperglycemia in the hospital was due initially to sepsis, then to steroid therapy.  She is now off steroids as of today, and glucoses in the 200-230 range have now decreased to 140-160 since 10 PM last night.  Hyperglycemia was well managed by the staff over the weekend, and insulin will now need to be tapered with steroids discontinued.  How quickly the insulin will need to be tapered is unclear, but would decrease the lantus to 15 units, the Humalog to 6 units TID/AC.  May need to go down even faster than this if any hypoglycemia during the next 12-24 hours.  She should eventually be treatable with oral agents.  Given her marked urinary frequency with small volumes, would check a post-void residual urine  ?? whether her acute CHF on admission was ischemic??

## 2019-02-11 NOTE — PROGRESS NOTE ADULT - PROBLEM SELECTOR PLAN 3
Patient with no hx of CHF, presented with w/ acute change in respiratory status with audible wheezing that improved s/p Duonebs x3, Solumedrol 125mg x1 and Mg 2g. Found to have elevated BNP >2000, b/l LE edema  -ABG consistent with respiratory alkalosis   -Duonebs q6hrs standing   -ECHO: EF 60%, mildly dilated RA, moderate AR, mild MR, sPAP 41mmHg  -CXR with congestion  -started on Lasix 20mg PO qd

## 2019-02-12 ENCOUNTER — TRANSCRIPTION ENCOUNTER (OUTPATIENT)
Age: 74
End: 2019-02-12

## 2019-02-12 VITALS
TEMPERATURE: 98 F | DIASTOLIC BLOOD PRESSURE: 86 MMHG | SYSTOLIC BLOOD PRESSURE: 155 MMHG | RESPIRATION RATE: 18 BRPM | OXYGEN SATURATION: 98 % | HEART RATE: 80 BPM

## 2019-02-12 DIAGNOSIS — R10.30 LOWER ABDOMINAL PAIN, UNSPECIFIED: ICD-10-CM

## 2019-02-12 PROBLEM — I10 ESSENTIAL (PRIMARY) HYPERTENSION: Chronic | Status: ACTIVE | Noted: 2019-02-08

## 2019-02-12 PROBLEM — E66.9 OBESITY, UNSPECIFIED: Chronic | Status: ACTIVE | Noted: 2019-02-08

## 2019-02-12 LAB — ALDOLASE SERPL-CCNC: 18.1 U/L — HIGH (ref 3.3–10.3)

## 2019-02-12 PROCEDURE — 99239 HOSP IP/OBS DSCHRG MGMT >30: CPT

## 2019-02-12 PROCEDURE — 99232 SBSQ HOSP IP/OBS MODERATE 35: CPT | Mod: GC

## 2019-02-12 RX ORDER — FUROSEMIDE 40 MG
1 TABLET ORAL
Qty: 0 | Refills: 0 | COMMUNITY
Start: 2019-02-12

## 2019-02-12 RX ORDER — PANTOPRAZOLE SODIUM 20 MG/1
1 TABLET, DELAYED RELEASE ORAL
Qty: 0 | Refills: 0 | COMMUNITY
Start: 2019-02-12

## 2019-02-12 RX ORDER — SENNA PLUS 8.6 MG/1
1 TABLET ORAL ONCE
Qty: 0 | Refills: 0 | Status: COMPLETED | OUTPATIENT
Start: 2019-02-12 | End: 2019-02-12

## 2019-02-12 RX ORDER — METFORMIN HYDROCHLORIDE 850 MG/1
1 TABLET ORAL
Qty: 0 | Refills: 0 | COMMUNITY

## 2019-02-12 RX ORDER — INSULIN LISPRO 100/ML
0 VIAL (ML) SUBCUTANEOUS
Qty: 0 | Refills: 0 | COMMUNITY
Start: 2019-02-12

## 2019-02-12 RX ORDER — ATENOLOL 25 MG/1
1 TABLET ORAL
Qty: 0 | Refills: 0 | COMMUNITY

## 2019-02-12 RX ORDER — ATENOLOL 25 MG/1
0 TABLET ORAL
Qty: 0 | Refills: 0 | COMMUNITY

## 2019-02-12 RX ORDER — HYDROCHLOROTHIAZIDE 25 MG
0 TABLET ORAL
Qty: 0 | Refills: 0 | COMMUNITY

## 2019-02-12 RX ORDER — BIMATOPROST 0.3 MG/ML
1 SOLUTION/ DROPS OPHTHALMIC
Qty: 0 | Refills: 0 | COMMUNITY

## 2019-02-12 RX ORDER — POLYETHYLENE GLYCOL 3350 17 G/17G
17 POWDER, FOR SOLUTION ORAL ONCE
Qty: 0 | Refills: 0 | Status: COMPLETED | OUTPATIENT
Start: 2019-02-12 | End: 2019-02-12

## 2019-02-12 RX ORDER — DOCUSATE SODIUM 100 MG
100 CAPSULE ORAL
Qty: 0 | Refills: 0 | Status: DISCONTINUED | OUTPATIENT
Start: 2019-02-12 | End: 2019-02-12

## 2019-02-12 RX ORDER — SITAGLIPTIN 50 MG/1
1 TABLET, FILM COATED ORAL
Qty: 0 | Refills: 0 | COMMUNITY

## 2019-02-12 RX ORDER — LORATADINE 10 MG/1
1 TABLET ORAL
Qty: 0 | Refills: 0 | COMMUNITY

## 2019-02-12 RX ORDER — INSULIN LISPRO 100/ML
6 VIAL (ML) SUBCUTANEOUS
Qty: 0 | Refills: 0 | COMMUNITY
Start: 2019-02-12

## 2019-02-12 RX ORDER — INSULIN GLARGINE 100 [IU]/ML
15 INJECTION, SOLUTION SUBCUTANEOUS
Qty: 0 | Refills: 0 | COMMUNITY
Start: 2019-02-12

## 2019-02-12 RX ORDER — ACETAMINOPHEN 500 MG
650 TABLET ORAL ONCE
Qty: 0 | Refills: 0 | Status: COMPLETED | OUTPATIENT
Start: 2019-02-12 | End: 2019-02-12

## 2019-02-12 RX ADMIN — SENNA PLUS 1 TABLET(S): 8.6 TABLET ORAL at 08:57

## 2019-02-12 RX ADMIN — Medication 6 UNIT(S): at 13:40

## 2019-02-12 RX ADMIN — HEPARIN SODIUM 5000 UNIT(S): 5000 INJECTION INTRAVENOUS; SUBCUTANEOUS at 06:21

## 2019-02-12 RX ADMIN — HEPARIN SODIUM 5000 UNIT(S): 5000 INJECTION INTRAVENOUS; SUBCUTANEOUS at 13:41

## 2019-02-12 RX ADMIN — Medication 650 MILLIGRAM(S): at 11:57

## 2019-02-12 RX ADMIN — POLYETHYLENE GLYCOL 3350 17 GRAM(S): 17 POWDER, FOR SOLUTION ORAL at 08:57

## 2019-02-12 RX ADMIN — LORATADINE 10 MILLIGRAM(S): 10 TABLET ORAL at 11:57

## 2019-02-12 RX ADMIN — Medication 1 TABLET(S): at 08:57

## 2019-02-12 RX ADMIN — PANTOPRAZOLE SODIUM 40 MILLIGRAM(S): 20 TABLET, DELAYED RELEASE ORAL at 06:21

## 2019-02-12 RX ADMIN — Medication 20 MILLIGRAM(S): at 06:21

## 2019-02-12 RX ADMIN — Medication 6 UNIT(S): at 08:57

## 2019-02-12 NOTE — PROGRESS NOTE ADULT - PROBLEM SELECTOR PLAN 9
F: none  E: replete PRN  D: DASH/TLC    DVT ppx: Heparin 5000u SQ Q8h  GI ppx: Protonix 40mg qd    Dispo: [facility]  Code: pending ALBINO

## 2019-02-12 NOTE — PROGRESS NOTE ADULT - PROBLEM SELECTOR PLAN 6
-replete
Patient denies a hx of DM, reports that A1c as outpatient was 7, however she wasn't on any medications, now with elevated FSG in the setting of steroids vs infection.   - A1c: 6.6 (2/9/19)  - Prednisone 40mg discontinued  - c/w Lantus 20u and lispro 7u TIDAC  - mISS   - monitor FSG
Patient denies a hx of DM, reports that A1c as outpatient was 7, however she wasn't on any medications, now with elevated FSG in the setting of steroids vs infection.   - A1c: 6.6 (2/9/19)  -Prednisone discontinued on 2/10, however patient persistently hyperglycemic  -c/w Lantus 24u and lispro 8u TIDAC  - mISS   - monitor FSG  - Endo consulted, follow recs
Patient denies a hx of DM, reports that A1c as outpatient was 7, however she wasn't on any medications, now with elevated FSG in the setting of steroids vs infection.   - A1c: 6.6 (2/9/19)  - Prednisone discontinued on 2/10  -FSG currently controlled  - c/w Lantus 15u and lispro 6u TIDAC  - mISS   - monitor FSG  - Endo on board, follow recs

## 2019-02-12 NOTE — DISCHARGE NOTE ADULT - CARE PLAN
Principal Discharge DX:	Severe sepsis  Secondary Diagnosis:	Urinary tract infection without hematuria, site unspecified  Secondary Diagnosis:	Acute on chronic diastolic congestive heart failure  Secondary Diagnosis:	Chronic bilateral low back pain, with sciatica presence unspecified  Secondary Diagnosis:	Hyperglycemia Principal Discharge DX:	Severe sepsis  Goal:	please continue antibiotics  Assessment and plan of treatment:	You presented with severe sepsis with a urinary tract infection, your blood culture was positive for bacteria. You were treated with IV antibiotics with improvement in symptoms and switched to oral antibiotics. Please continue taking Bactrim 180-600mg DS tablet twice daily for a total of 5 more days, your last dose will be on February 16.  Secondary Diagnosis:	Urinary tract infection without hematuria, site unspecified  Assessment and plan of treatment:	Please continue taking antibiotics as above  Secondary Diagnosis:	Acute on chronic diastolic congestive heart failure  Assessment and plan of treatment:	You were found to have heart failure, and were started on lasix 20mg daily  Secondary Diagnosis:	Chronic bilateral low back pain, with sciatica presence unspecified  Secondary Diagnosis:	Hyperglycemia Principal Discharge DX:	Severe sepsis  Goal:	please continue antibiotics  Assessment and plan of treatment:	You presented with severe sepsis with a urinary tract infection, your blood culture was positive for bacteria. You were treated with IV antibiotics with improvement in symptoms and switched to oral antibiotics. Please continue taking Bactrim 180-600mg DS tablet twice daily, your last dose will be on February 16. Please follow up with your primary care doctor  Secondary Diagnosis:	Urinary tract infection without hematuria, site unspecified  Assessment and plan of treatment:	Please continue taking antibiotics as above  Secondary Diagnosis:	Acute on chronic diastolic congestive heart failure  Assessment and plan of treatment:	You were found to have heart failure, and were started on Lasix 20mg daily. Please continue taking this medication and follow up with your primary care doctor. If you experience increasing shortness of breath with leg swelling, please go to your doctor or the emergency room.  Secondary Diagnosis:	Chronic bilateral low back pain, with sciatica presence unspecified  Assessment and plan of treatment:	Please continue following up with your primary care doctor  Secondary Diagnosis:	Hyperglycemia  Assessment and plan of treatment:	Your blood sugars were elevated at the hospital, and you have a history of diabetes as you reported your Hemoglobin A1c as outpatient was 7, however you weren't on any medications, your A1c at the hospital was 6.6. Due to elevated blood sugars, you were started on insulin lantus 15u at bedtime and lispro 6u three times a day at least 15 minutes before meals. It is important that you do not skip a meal while taking insulin. Please continue this insulin regimen and follow up with your primary care provider. If you experience dizziness, sweating, shaking, please take check your blood sugar levels, if your blood sugar is less than 80, please take orange juice or sugar pills and contact your endocrinologist or primary care doctor  Secondary Diagnosis:	Essential hypertension  Assessment and plan of treatment:	You have high blood pressure and take hydrochlorothiazide and Atenolol at home. Your blood pressure medications were held due to sepsis. However, you were started on furosemide which is a water pill and a blood pressure medication. Please continue taking Furosemide 20mg daily and follow up with your primary care doctor for further management. Principal Discharge DX:	Severe sepsis  Goal:	please continue antibiotics  Assessment and plan of treatment:	You presented with severe sepsis with a urinary tract infection, your blood culture was positive for bacteria. You were treated with IV antibiotics with improvement in symptoms and switched to oral antibiotics. Please continue taking Bactrim 180-600mg DS tablet twice daily, your last dose will be on February 16. Please follow up with your primary care doctor  Secondary Diagnosis:	Urinary tract infection without hematuria, site unspecified  Assessment and plan of treatment:	Please continue taking antibiotics as above  Secondary Diagnosis:	Acute on chronic diastolic congestive heart failure  Assessment and plan of treatment:	You were found to have heart failure, and were started on Lasix 20mg daily. Please continue taking this medication and follow up with your primary care doctor. If you experience increasing shortness of breath with leg swelling, please go to your doctor or the emergency room.  Secondary Diagnosis:	Chronic bilateral low back pain, with sciatica presence unspecified  Assessment and plan of treatment:	Please continue following up with your primary care doctor  Secondary Diagnosis:	Diabetes  Assessment and plan of treatment:	Your blood sugars were elevated at the hospital, and you have a history of diabetes as you reported your Hemoglobin A1c as outpatient was 7, however you weren't on any medications, your A1c at the hospital was 6.6. Due to elevated blood sugars, you were started on insulin and switched to oral medications. Please continue taking Metformin 500mg twice a day, and Januvia 100mg in the morning. Please continue insulin on a sliding scale regimen as per the above instructions. Please follow up with the endocrinology clinic on February 20th at 9am  Secondary Diagnosis:	Essential hypertension  Assessment and plan of treatment:	You have high blood pressure and take hydrochlorothiazide and Atenolol at home. Your blood pressure medications were held due to sepsis. However, you were started on furosemide which is a water pill and a blood pressure medication. Please continue taking Furosemide 20mg daily and follow up with your primary care doctor for further management. Principal Discharge DX:	Severe sepsis  Goal:	please continue antibiotics  Assessment and plan of treatment:	You presented with severe sepsis with a urinary tract infection, your blood culture was positive for bacteria. You were treated with IV antibiotics with improvement in symptoms and switched to oral antibiotics. Please continue taking Bactrim 180-600mg DS tablet twice daily, your last dose will be on February 16. Please follow up with your primary care doctor  Secondary Diagnosis:	Urinary tract infection without hematuria, site unspecified  Assessment and plan of treatment:	Please continue taking antibiotics as above  Secondary Diagnosis:	Acute on chronic diastolic congestive heart failure  Assessment and plan of treatment:	You were found to have heart failure, and were started on Lasix 20mg daily. Please continue taking this medication and follow up with your primary care doctor. If you experience increasing shortness of breath with leg swelling, please go to your doctor or the emergency room.  Secondary Diagnosis:	Chronic bilateral low back pain, with sciatica presence unspecified  Assessment and plan of treatment:	Please continue following up with your primary care doctor  Secondary Diagnosis:	Diabetes  Assessment and plan of treatment:	Your blood sugars were elevated at the hospital, and you have a history of diabetes as you reported your Hemoglobin A1c as outpatient was 7, however you weren't on any medications, your A1c at the hospital was 6.6. Due to elevated blood sugars, you were started on insulin and switched to oral medications. Please start taking Metformin 500mg twice a day (your first dose is on 2/12 evening), and Januvia 100mg in the morning. Please continue insulin on a sliding scale regimen as per the above instructions. Please follow up with the endocrinology clinic on February 20th at 9am  Secondary Diagnosis:	Essential hypertension  Assessment and plan of treatment:	You have high blood pressure and take hydrochlorothiazide and Atenolol at home. Your blood pressure medications were held due to sepsis. However, you were started on furosemide which is a water pill and a blood pressure medication. Please continue taking Furosemide 20mg daily and follow up with your primary care doctor for further management.

## 2019-02-12 NOTE — PROGRESS NOTE ADULT - PROBLEM SELECTOR PLAN 3
Patient with no hx of CHF, presented with w/ acute change in respiratory status with audible wheezing that improved s/p Duonebs x3, Solumedrol 125mg x1 and Mg 2g. Found to have elevated BNP >2000, b/l LE edema  -ABG consistent with respiratory alkalosis   -Duonebs q6hrs standing   -ECHO: EF 60%, mildly dilated RA, moderate AR, mild MR, sPAP 41mmHg  -CXR with congestion  -c/w Lasix 20mg PO qd    #Respiratory distress  Resolved. Patient w/ acute change in respiratory status with audible wheezing that improved s/p Duonebs x3, Solumedrol 125mg x1 and Mg 2g. No reported hx of Asthma, COPD or Tobacco use. Etiology of reactive airway likely secondary to infectious lung process vs bronchospasm of unknown etiology vs acute CHF given pt w/ b/l LE edema and BNP >2000  -ABG consistent with respiratory alkalosis   -echo w/ dCHF, edema on exam

## 2019-02-12 NOTE — PROGRESS NOTE ADULT - PROBLEM SELECTOR PLAN 5
Patient presented w/ b/l LE weakness and difficulty standing from commode. Patient w/ chronic lower back pain for which MRI was negative for acute signs of cord compression and only notable for disc protrusion in L3-L4, L4-L5, L5-S1. Xray lumbar spine normal. Etiology likely in the setting of infection vs. electrolyte/metabolic given pt w/ hypokalemia on admission and elevated CK of 246 vs. myopathy.   -Neurology following --appreciate recs   -MRI L-spine: no cord compression  -Neurology recommend EMG as outpatient  -TSH: 0.309, free T4 0.92, ESR 93  -B12 >2000, Folate 13.6  -f/u Aldolase, RPR    #Chronic b/l lower back pain  -pain medications prn  -follow up MRI C and T spine per neuro recs  -management as above Patient presented w/ b/l LE weakness and difficulty standing from commode. Patient w/ chronic lower back pain for which MRI was negative for acute signs of cord compression and only notable for disc protrusion in L3-L4, L4-L5, L5-S1. Xray lumbar spine normal. Etiology likely in the setting of infection vs. electrolyte/metabolic given pt w/ hypokalemia on admission and elevated CK of 246 vs. myopathy.   -Neurology following --appreciate recs   -MRI L-spine: no cord compression  -Neurology recommend EMG as outpatient  -TSH: 0.309, free T4 0.92, ESR 93  -B12 >2000, Folate 13.6  -RPR negative  -f/u Aldolase    #Chronic b/l lower back pain  -pain medications prn  -follow up MRI C and T spine per neuro recs  -management as above

## 2019-02-12 NOTE — DISCHARGE NOTE ADULT - HOSPITAL COURSE
72 yo F with a PMH of lumbar herniated discs, HTN, allergic sinusitis (takes claritin), b/l knee OA who initially presented from home w/ b/l LE weakness, found to be in respiratory distress, meeting severe sepsis criteria (T 103, WBC 34.96, 33% bands and Lactic acid of 2.5), UA grossly positive, and CXR with questionable R sided consolidation, treated with Cef/Azithro, BCx growing E.coli, started on Ceftriaxone 2g qd  72 y/o F w/ PMHx notable for lumbar herniated discs, HTN, allergic sinusitis (takes claritin), b/l knee OA who initially presented from home w/ b/l LE weakness. Patient reports that this morning she woke up at around 4am and while in the bathroom patient reports that she was unable to stand from toilet. Patient reported that she was stuck on the commode for at least 8 hours before her  called EMS. Patient denies any associated chest pain, palpitations, headache, dizziness, shortness of breath, cough, nausea, vomiting, fever/chills, dysuria, numbness, urinary incontinence, numbness/tingling. No loss of consciousness no recent travel or sick contacts.     Upon arrival pt w/ T: 98.8, HR: 86, BP: 159/80, RR:21, O2: 97% on room air.   Labs notable for WBC of 34.96 with 33% bands, K 2.7, Cr 1.05, TSH 0.309,   Patient was evaluated by neurology and had Lumbar MRI which was negative for any signs of cord compression.   Patient was given Potassium 40meq x2    When patient was evaluated at bedside she was noted to have significant audible wheezing and difficulty speaking in full sentences. Patient denied any history of asthma, copd or tobacco use. No inhaler use at home. Patient was given Duonebs x3, Solumedrol 125mg, Mg 2g.   Repeat vitals notable for Tmax 103.2 (rectal), HR 86, /64, RR: 32 O2: 96% on 8L via aerosol mask.   Blood cultures, RVP, ABG obtained and pt given Tylenol 975, 1L NS, Cef + Azithromycin. 72 yo F with a PMH of lumbar herniated discs, HTN, allergic sinusitis (takes claritin), b/l knee OA who initially presented from home w/ b/l LE weakness, found to be in respiratory distress with audible wheezing and difficulty speaking in full sentences (resolved on duonebs, Mg 2g and solumedrol), she also met severe sepsis criteria (T 103, WBC 34.96, 33% bands and Lactic acid of 2.5), UA grossly positive, and CXR with questionable R sided consolidation, treated with Cef/Azithro, BCx growing pan-senstive E. coli, transitioned from Ceftriaxone 2g qd to Bactrim DS BID. Patient with hyperglycemia episodes and was started on basal-bolus insulin. Her FSG improved after discontinuation of Prednisone, however still persistently high, endocrine recommended continuing insulin. Patient is medically stable for discharge to [facility] with follow up with PMD and Endocrine. 74 yo F with a PMH of lumbar herniated discs, HTN, allergic sinusitis (takes claritin), b/l knee OA who initially presented from home w/ b/l LE weakness, found to be in respiratory distress with audible wheezing and difficulty speaking in full sentences (resolved on duonebs, Mg 2g and solumedrol), she also met severe sepsis criteria (T 103, WBC 34.96, 33% bands and Lactic acid of 2.5), UA grossly positive, and CXR with questionable R sided consolidation, treated with Cef/Azithro, BCx growing pan-senstive E. coli, transitioned from Ceftriaxone 2g qd to Bactrim DS BID. Patient with hyperglycemia episodes and was started on basal-bolus insulin. Her FSG improved after discontinuation of Prednisone, however still persistently high, endocrine recommended continuing insulin. ECHO done showed EF 60%, patient with congestion on CXR and b/l edema, started on lasix 20mg PO daily. Patient is medically stable for discharge to [facility] with follow up with PMD and Endocrine.

## 2019-02-12 NOTE — PROGRESS NOTE ADULT - SUBJECTIVE AND OBJECTIVE BOX
Interval HPI/overnight events:    VITAL SIGNS:  Vital Signs Last 24 Hrs  T(C): 36.6 (12 Feb 2019 05:53), Max: 36.7 (11 Feb 2019 23:03)  T(F): 97.9 (12 Feb 2019 05:53), Max: 98.1 (11 Feb 2019 23:03)  HR: 79 (12 Feb 2019 05:53) (73 - 85)  BP: 173/83 (12 Feb 2019 05:53) (157/81 - 176/74)  BP(mean): --  RR: 18 (12 Feb 2019 05:53) (17 - 18)  SpO2: 98% (12 Feb 2019 05:53) (97% - 98%)    PHYSICAL EXAM:    General: in NAD, lying comfortably in bed  HEENT: normocephalic, atraumatic; PERRL, anicteric sclera; MMM  Neck: supple, no JVD, no thyromegaly, no lymphadenopathy  Cardiovascular: +S1/S2, RRR, no M/G/R  Respiratory: clear to auscultation B/L; no wheezing, no rales, no rhonchi  Gastrointestinal: soft, NT/ND; +BSx4, no organomegaly  Extremities: WWP; no edema, clubbing or cyanosis  Vascular: 2+ radial, DP/PT pulses B/L  Neurological: AAOx3; no focal deficits    MEDICATIONS:  MEDICATIONS  (STANDING):  dextrose 5%. 1000 milliLiter(s) (50 mL/Hr) IV Continuous <Continuous>  dextrose 50% Injectable 12.5 Gram(s) IV Push once  dextrose 50% Injectable 25 Gram(s) IV Push once  dextrose 50% Injectable 25 Gram(s) IV Push once  furosemide    Tablet 20 milliGRAM(s) Oral daily  heparin  Injectable 5000 Unit(s) SubCutaneous every 8 hours  insulin glargine Injectable (LANTUS) 15 Unit(s) SubCutaneous at bedtime  insulin lispro (HumaLOG) corrective regimen sliding scale   SubCutaneous Before meals and at bedtime  insulin lispro Injectable (HumaLOG) 6 Unit(s) SubCutaneous three times a day before meals  loratadine 10 milliGRAM(s) Oral daily  pantoprazole    Tablet 40 milliGRAM(s) Oral before breakfast  polyethylene glycol 3350 17 Gram(s) Oral once  senna 1 Tablet(s) Oral once  trimethoprim  160 mG/sulfamethoxazole 800 mG 1 Tablet(s) Oral every 12 hours    MEDICATIONS  (PRN):  dextrose 40% Gel 15 Gram(s) Oral once PRN Blood Glucose LESS THAN 70 milliGRAM(s)/deciliter  glucagon  Injectable 1 milliGRAM(s) IntraMuscular once PRN Glucose LESS THAN 70 milligrams/deciliter  ondansetron Injectable 4 milliGRAM(s) IV Push every 8 hours PRN Nausea and/or Vomiting      ALLERGIES:  Allergies    No Known Allergies    Intolerances        LABS:                        11.6   28.28 )-----------( 120      ( 11 Feb 2019 05:36 )             35.0     02-11    139  |  101  |  37<H>  ----------------------------<  169<H>  3.1<L>   |  28  |  0.90    Ca    8.4      11 Feb 2019 05:36  Mg     2.2     02-11          CAPILLARY BLOOD GLUCOSE      POCT Blood Glucose.: 131 mg/dL (12 Feb 2019 08:28)      RADIOLOGY & ADDITIONAL TESTS: Reviewed. Interval HPI/overnight events: overnight, patient c/o lower abdominal pain, bladder scan 264cc, however patient voided after. This am, patient still endorsing abdominal pain, her last BM was yesterday, but reports it was very minimal. Denies fever, chills, SOB, nausea/vomiting, diarrhea, constipation.    VITAL SIGNS:  Vital Signs Last 24 Hrs  T(C): 36.6 (12 Feb 2019 05:53), Max: 36.7 (11 Feb 2019 23:03)  T(F): 97.9 (12 Feb 2019 05:53), Max: 98.1 (11 Feb 2019 23:03)  HR: 79 (12 Feb 2019 05:53) (73 - 85)  BP: 173/83 (12 Feb 2019 05:53) (157/81 - 176/74)  BP(mean): --  RR: 18 (12 Feb 2019 05:53) (17 - 18)  SpO2: 98% (12 Feb 2019 05:53) (97% - 98%)    PHYSICAL EXAM:  General: patient seen sitting on the commode, in NAD  HEENT: normocephalic, atraumatic; PERRL, anicteric sclera; MMM  Neck: supple, no JVD, no thyromegaly, no lymphadenopathy  Cardiovascular: +S1/S2, RRR, no M/G/R  Respiratory: clear to auscultation B/L; no wheezing, no rales, no rhonchi  Gastrointestinal: soft, NT/ND; +BSx4, no organomegaly  Extremities: WWP; no edema, clubbing or cyanosis  Vascular: 2+ radial, DP/PT pulses B/L  Neurological: AAOx3; no focal deficits    MEDICATIONS:  MEDICATIONS  (STANDING):  dextrose 5%. 1000 milliLiter(s) (50 mL/Hr) IV Continuous <Continuous>  dextrose 50% Injectable 12.5 Gram(s) IV Push once  dextrose 50% Injectable 25 Gram(s) IV Push once  dextrose 50% Injectable 25 Gram(s) IV Push once  furosemide    Tablet 20 milliGRAM(s) Oral daily  heparin  Injectable 5000 Unit(s) SubCutaneous every 8 hours  insulin glargine Injectable (LANTUS) 15 Unit(s) SubCutaneous at bedtime  insulin lispro (HumaLOG) corrective regimen sliding scale   SubCutaneous Before meals and at bedtime  insulin lispro Injectable (HumaLOG) 6 Unit(s) SubCutaneous three times a day before meals  loratadine 10 milliGRAM(s) Oral daily  pantoprazole    Tablet 40 milliGRAM(s) Oral before breakfast  polyethylene glycol 3350 17 Gram(s) Oral once  senna 1 Tablet(s) Oral once  trimethoprim  160 mG/sulfamethoxazole 800 mG 1 Tablet(s) Oral every 12 hours    MEDICATIONS  (PRN):  dextrose 40% Gel 15 Gram(s) Oral once PRN Blood Glucose LESS THAN 70 milliGRAM(s)/deciliter  glucagon  Injectable 1 milliGRAM(s) IntraMuscular once PRN Glucose LESS THAN 70 milligrams/deciliter  ondansetron Injectable 4 milliGRAM(s) IV Push every 8 hours PRN Nausea and/or Vomiting      ALLERGIES:  Allergies    No Known Allergies    Intolerances        LABS:                        11.6   28.28 )-----------( 120      ( 11 Feb 2019 05:36 )             35.0     02-11    139  |  101  |  37<H>  ----------------------------<  169<H>  3.1<L>   |  28  |  0.90    Ca    8.4      11 Feb 2019 05:36  Mg     2.2     02-11          CAPILLARY BLOOD GLUCOSE      POCT Blood Glucose.: 131 mg/dL (12 Feb 2019 08:28)      RADIOLOGY & ADDITIONAL TESTS: Reviewed. Interval HPI/overnight events: overnight, patient c/o lower abdominal pain, bladder scan 264cc, however patient voided after. This am, patient still endorsing abdominal pain, her last BM was yesterday, but reports it was very minimal. Denies fever, chills, SOB, nausea/vomiting, diarrhea, constipation.    VITAL SIGNS:  Vital Signs Last 24 Hrs  T(C): 36.6 (12 Feb 2019 05:53), Max: 36.7 (11 Feb 2019 23:03)  T(F): 97.9 (12 Feb 2019 05:53), Max: 98.1 (11 Feb 2019 23:03)  HR: 79 (12 Feb 2019 05:53) (73 - 85)  BP: 173/83 (12 Feb 2019 05:53) (157/81 - 176/74)  BP(mean): --  RR: 18 (12 Feb 2019 05:53) (17 - 18)  SpO2: 98% (12 Feb 2019 05:53) (97% - 98%)    PHYSICAL EXAM:  General: patient seen sitting on the commode, in NAD  HEENT: normocephalic, atraumatic; PERRL, anicteric sclera; MMM  Neck: supple, no JVD, no thyromegaly, no lymphadenopathy  Cardiovascular: +S1/S2, RRR, no M/G/R  Respiratory: bibasilar crackles; no wheezing, no rales, no rhonchi  Gastrointestinal: soft, non-tender to palpation, ND; +BSx4, no organomegaly  Extremities: WWP; 1+ pedal pitting edema, no clubbing or cyanosis  Vascular: 2+ radial, DP/PT pulses B/L  Neurological: AAOx3; strength 5/5 b/l LE and UEs, no focal deficits    MEDICATIONS:  MEDICATIONS  (STANDING):  dextrose 5%. 1000 milliLiter(s) (50 mL/Hr) IV Continuous <Continuous>  dextrose 50% Injectable 12.5 Gram(s) IV Push once  dextrose 50% Injectable 25 Gram(s) IV Push once  dextrose 50% Injectable 25 Gram(s) IV Push once  furosemide    Tablet 20 milliGRAM(s) Oral daily  heparin  Injectable 5000 Unit(s) SubCutaneous every 8 hours  insulin glargine Injectable (LANTUS) 15 Unit(s) SubCutaneous at bedtime  insulin lispro (HumaLOG) corrective regimen sliding scale   SubCutaneous Before meals and at bedtime  insulin lispro Injectable (HumaLOG) 6 Unit(s) SubCutaneous three times a day before meals  loratadine 10 milliGRAM(s) Oral daily  pantoprazole    Tablet 40 milliGRAM(s) Oral before breakfast  polyethylene glycol 3350 17 Gram(s) Oral once  senna 1 Tablet(s) Oral once  trimethoprim  160 mG/sulfamethoxazole 800 mG 1 Tablet(s) Oral every 12 hours    MEDICATIONS  (PRN):  dextrose 40% Gel 15 Gram(s) Oral once PRN Blood Glucose LESS THAN 70 milliGRAM(s)/deciliter  glucagon  Injectable 1 milliGRAM(s) IntraMuscular once PRN Glucose LESS THAN 70 milligrams/deciliter  ondansetron Injectable 4 milliGRAM(s) IV Push every 8 hours PRN Nausea and/or Vomiting      ALLERGIES:  Allergies    No Known Allergies    Intolerances        LABS:                        11.6   28.28 )-----------( 120      ( 11 Feb 2019 05:36 )             35.0     02-11    139  |  101  |  37<H>  ----------------------------<  169<H>  3.1<L>   |  28  |  0.90    Ca    8.4      11 Feb 2019 05:36  Mg     2.2     02-11      POCT Blood Glucose.: 131 mg/dL (12 Feb 2019 08:28)      RADIOLOGY & ADDITIONAL TESTS: Reviewed.

## 2019-02-12 NOTE — DISCHARGE NOTE ADULT - PLAN OF CARE
please continue antibiotics You presented with severe sepsis with a urinary tract infection, your blood culture was positive for bacteria. You were treated with IV antibiotics with improvement in symptoms and switched to oral antibiotics. Please continue taking Bactrim 180-600mg DS tablet twice daily for a total of 5 more days, your last dose will be on February 16. Please continue taking antibiotics as above You were found to have heart failure, and were started on lasix 20mg daily You presented with severe sepsis with a urinary tract infection, your blood culture was positive for bacteria. You were treated with IV antibiotics with improvement in symptoms and switched to oral antibiotics. Please continue taking Bactrim 180-600mg DS tablet twice daily, your last dose will be on February 16. Please follow up with your primary care doctor You were found to have heart failure, and were started on Lasix 20mg daily. Please continue taking this medication and follow up with your primary care doctor. If you experience increasing shortness of breath with leg swelling, please go to your doctor or the emergency room. Please continue following up with your primary care doctor Your blood sugars were elevated at the hospital, and you have a history of diabetes as you reported your Hemoglobin A1c as outpatient was 7, however you weren't on any medications, your A1c at the hospital was 6.6. Due to elevated blood sugars, you were started on insulin lantus 15u at bedtime and lispro 6u three times a day at least 15 minutes before meals. It is important that you do not skip a meal while taking insulin. Please continue this insulin regimen and follow up with your primary care provider. If you experience dizziness, sweating, shaking, please take check your blood sugar levels, if your blood sugar is less than 80, please take orange juice or sugar pills and contact your endocrinologist or primary care doctor You have high blood pressure and take hydrochlorothiazide and Atenolol at home. Your blood pressure medications were held due to sepsis. However, you were started on furosemide which is a water pill and a blood pressure medication. Please continue taking Furosemide 20mg daily and follow up with your primary care doctor for further management. Your blood sugars were elevated at the hospital, and you have a history of diabetes as you reported your Hemoglobin A1c as outpatient was 7, however you weren't on any medications, your A1c at the hospital was 6.6. Due to elevated blood sugars, you were started on insulin and switched to oral medications. Please continue taking Metformin 500mg twice a day, and Januvia 100mg in the morning. Please continue insulin on a sliding scale regimen as per the above instructions. Please follow up with the endocrinology clinic on February 20th at 9am Your blood sugars were elevated at the hospital, and you have a history of diabetes as you reported your Hemoglobin A1c as outpatient was 7, however you weren't on any medications, your A1c at the hospital was 6.6. Due to elevated blood sugars, you were started on insulin and switched to oral medications. Please start taking Metformin 500mg twice a day (your first dose is on 2/12 evening), and Januvia 100mg in the morning. Please continue insulin on a sliding scale regimen as per the above instructions. Please follow up with the endocrinology clinic on February 20th at 9am

## 2019-02-12 NOTE — DISCHARGE NOTE ADULT - MEDICATION SUMMARY - MEDICATIONS TO TAKE
I will START or STAY ON the medications listed below when I get home from the hospital:    insulin glargine  -- 15 unit(s) subcutaneous once a day (at bedtime)  -- Indication: For Diabetes/Hyperglycemia    insulin lispro  -- 6 unit(s) subcutaneous 3 times a day (before meals)  -- Indication: For Diabetes/Hyperglycemia    furosemide 20 mg oral tablet  -- 1 tab(s) by mouth once a day  -- Indication: For Acute on chronic diastolic congestive heart failure    Lumigan 0.01% ophthalmic solution  -- 1 drop(s) to each affected eye once a day (in the evening)  -- Indication: For glaucoma    pantoprazole 40 mg oral delayed release tablet  -- 1 tab(s) by mouth once a day (before a meal)  -- Indication: For Acid reflux    sulfamethoxazole-trimethoprim 800 mg-160 mg oral tablet  -- 1 tab(s) by mouth every 12 hours  -- Indication: For Severe sepsis due to UTI I will START or STAY ON the medications listed below when I get home from the hospital:    insulin lispro  -- 4 times a day (before meals and at bedtime)   2 Unit(s) if Glucose 151 - 200  4 Unit(s) if Glucose 201 - 250  6 Unit(s) if Glucose 251 - 300  8 Unit(s) if Glucose 301 - 350  10 Unit(s) if Glucose 351 - 400  12 Unit(s) if Glucose Greater Than 400  -- Indication: For Diabetes    metFORMIN 500 mg oral tablet  -- 1 tab(s) by mouth 2 times a day  -- Indication: For Diabetes    Januvia 100 mg oral tablet  -- 1 tab(s) by mouth once a day, in the morning  -- Indication: For Diabetes    furosemide 20 mg oral tablet  -- 1 tab(s) by mouth once a day  -- Indication: For Acute on chronic diastolic congestive heart failure    Lumigan 0.01% ophthalmic solution  -- 1 drop(s) to each affected eye once a day (in the evening)  -- Indication: For glaucoma    pantoprazole 40 mg oral delayed release tablet  -- 1 tab(s) by mouth once a day (before a meal)  -- Indication: For Acid reflux    sulfamethoxazole-trimethoprim 800 mg-160 mg oral tablet  -- 1 tab(s) by mouth every 12 hours  -- Indication: For Severe sepsis due to UTI

## 2019-02-12 NOTE — DISCHARGE NOTE ADULT - ADDITIONAL INSTRUCTIONS
Please follow up with Endocrinology at [address] on Wednesday, February 20th at 9am    Please follow up with Dr. Garcia on Wednesday, February 27th at 9.40am. The office will call you to re-schedule this appointment if another one becomes available sooner

## 2019-02-12 NOTE — PROGRESS NOTE ADULT - SUBJECTIVE AND OBJECTIVE BOX
Physical Medicine and Rehabilitation Progress Note:    Patient is a 73y old  Female who presents with a chief complaint of Weakness (12 Feb 2019 10:26)      HPI:  72 y/o F w/ PMHx notable for lumbar herniated discs, HTN, allergic sinusitis (takes claritin), b/l knee OA who initially presented from home w/ b/l LE weakness. Patient reports that this morning she woke up at around 4am and while in the bathroom patient reports that she was unable to stand from toilet. Patient reported that she was stuck on the commode for at least 8 hours before her  called EMS. Patient denies any associated chest pain, palpitations, headache, dizziness, shortness of breath, cough, nausea, vomiting, fever/chills, dysuria, numbness, urinary incontinence, numbness/tingling. No loss of consciousness no recent travel or sick contacts.     Upon arrival pt w/ T: 98.8, HR: 86, BP: 159/80, RR:21, O2: 97% on room air.   Labs notable for WBC of 34.96 with 33% bands, K 2.7, Cr 1.05, TSH 0.309,   Patient was evaluated by neurology and had Lumbar MRI which was negative for any signs of cord compression.   Patient was given Potassium 40meq x2    When patient was evaluated at bedside she was noted to have significant audible wheezing and difficulty speaking in full sentences. Patient denied any history of asthma, copd or tobacco use. No inhaler use at home. Patient was given Duonebs x3, Solumedrol 125mg, Mg 2g.   Repeat vitals notable for Tmax 103.2 (rectal), HR 86, /64, RR: 32 O2: 96% on 8L via aerosol mask.   Blood cultures, RVP, ABG obtained and pt given Tylenol 975, 1L NS, Cef + Azithromycin. (08 Feb 2019 19:22)                            11.6   28.28 )-----------( 120      ( 11 Feb 2019 05:36 )             35.0       02-11    139  |  101  |  37<H>  ----------------------------<  169<H>  3.1<L>   |  28  |  0.90    Ca    8.4      11 Feb 2019 05:36  Mg     2.2     02-11      Vital Signs Last 24 Hrs  T(C): 36.6 (12 Feb 2019 05:53), Max: 36.7 (11 Feb 2019 23:03)  T(F): 97.9 (12 Feb 2019 05:53), Max: 98.1 (11 Feb 2019 23:03)  HR: 79 (12 Feb 2019 05:53) (73 - 85)  BP: 173/83 (12 Feb 2019 05:53) (157/81 - 176/74)  BP(mean): --  RR: 18 (12 Feb 2019 05:53) (17 - 18)  SpO2: 98% (12 Feb 2019 05:53) (97% - 98%)    MEDICATIONS  (STANDING):  acetaminophen   Tablet .. 650 milliGRAM(s) Oral once  dextrose 5%. 1000 milliLiter(s) (50 mL/Hr) IV Continuous <Continuous>  dextrose 50% Injectable 12.5 Gram(s) IV Push once  dextrose 50% Injectable 25 Gram(s) IV Push once  dextrose 50% Injectable 25 Gram(s) IV Push once  furosemide    Tablet 20 milliGRAM(s) Oral daily  heparin  Injectable 5000 Unit(s) SubCutaneous every 8 hours  insulin glargine Injectable (LANTUS) 15 Unit(s) SubCutaneous at bedtime  insulin lispro (HumaLOG) corrective regimen sliding scale   SubCutaneous Before meals and at bedtime  insulin lispro Injectable (HumaLOG) 6 Unit(s) SubCutaneous three times a day before meals  loratadine 10 milliGRAM(s) Oral daily  pantoprazole    Tablet 40 milliGRAM(s) Oral before breakfast  trimethoprim  160 mG/sulfamethoxazole 800 mG 1 Tablet(s) Oral every 12 hours    MEDICATIONS  (PRN):  dextrose 40% Gel 15 Gram(s) Oral once PRN Blood Glucose LESS THAN 70 milliGRAM(s)/deciliter  glucagon  Injectable 1 milliGRAM(s) IntraMuscular once PRN Glucose LESS THAN 70 milligrams/deciliter  ondansetron Injectable 4 milliGRAM(s) IV Push every 8 hours PRN Nausea and/or Vomiting    Currently Undergoing Physical Therapy at bedside.    Functional Status Assessment:    Previous Level of Function:     · Ambulation Skills	needs device	  · Transfer Skills	needs device	  · ADL Skills	needs device	  · Work/Leisure Activity	needs device	  · Additional Comments	Pt lives alone in apartment building with 1 flight to enter, and 1 additional flight to unit. Pt reports functional decline in recent weeks. Uses commode/elevated toilet seat for toileting, uses wash basin for washing up. Pt reports used to use bus but has not been to appointments lately. Has community support for groceries, someone from [employer] deliver 1x/week. Pt reports sleeping in recliner as difficulty getting OOB. Denies falls, help in home other than cleaning person.	    Cognitive Status Examination:   · Orientation	oriented to person, place, time and situation	  · Level of Consciousness	alert	  · Follows Commands and Answers Questions	100% of the time; increased time to process/follow commands	  · Personal Safety and Judgment	intact	  · Short Term Memory	impaired	    Skin:   Skin:  · Skin	WDL	  · Skin Temperature	warm	  · Skin Moisture	dry	  · Skin	quick return to original state	    Range of Motion Exam:   · Active Range of Motion Examination	B hip flexion 0-100; bilateral upper extremity Active ROM was WFL (within functional limits); bilateral  lower extremity Active ROM was WFL (within functional limits)	    Manual Muscle Testing:   · Manual Muscle Testing Results	MMT 4+/5 except B hip flexion 3/5, B knee flexion 4/5	    Bed Mobility: Rolling/Turning:     · Level of [place]	stand-by assist	  · Physical Assist/Nonphysical Assist	1 person assist; verbal cues	  · Assistive Device	bed rails	    Bed Mobility: Scooting/Bridging:     · Level of [place]	stand-by assist	  · Physical Assist/Nonphysical Assist	verbal cues	  · Assistive Device	bed rails	    Bed Mobility: Sit to Supine:     · Level of [place]	minimum assist (75% patients effort)	  · Physical Assist/Nonphysical Assist	1 person assist; LE management	  · Assistive Device	bed rails	    Bed Mobility: Supine to Sit:     · Level of [place]	minimum assist (75% patients effort)	  · Physical Assist/Nonphysical Assist	1 person assist; LE management	  · Assistive Device	bed rails	    Bed Mobility Analysis:     · Bed Mobility Limitations	decreased ability to use arms for pushing/pulling; decreased ability to use legs for bridging/pushing; impaired ability to control trunk for mobility	  · Impairments Contributing to Impaired Bed Mobility	impaired balance; decreased flexibility; pain; decreased strength	    Transfer: Bed to Chair:     Transfer Skill: Bed to Chair   · Level of [place]	minimum assist (75% patients effort); to commode	  · Physical Assist/Nonphysical Assist	1 person assist; verbal cues	  · Weight-Bearing Restrictions	full weight-bearing	  · Assistive Device	rolling walker	    Transfer: Chair to Bed:     · Level of [place]	minimum assist (75% patients effort)	  · Physical Assist/Nonphysical Assist	1 person assist; verbal cues	  · Weight-Bearing Restrictions	full weight-bearing	  · Assistive Device	rolling walker	    Bed/Chair Transfer Safety Analysis:     · Impairments Contributing to Impaired Transfers	impaired balance; decreased strength; impaired postural control	  · Transfer Safety Concerns Noted	decreased balance during turns; decreased weight-shifting ability	    Transfer: Sit to Stand:     · Level of [place]	minimum assist (75% patients effort)	  · Physical Assist/Nonphysical Assist	1 person assist	  · Weight-Bearing Restrictions	full weight-bearing	  · Assistive Device	rolling walker; elevated bed height	    Transfer: Stand to Sit:     · Level of [place]	minimum assist (75% patients effort)	  · Physical Assist/Nonphysical Assist	1 person assist; verbal cues	  · Weight-Bearing Restrictions	full weight-bearing	  · Assistive Device	rolling walker; elevated bed height	    Sit/Stand Transfer Safety Analysis:     · Transfer Safety Concerns Noted	decreased balance during turns; decreased weight-shifting ability	  · Impairments Contributing to Impaired Transfers	impaired balance; cognition; impaired postural control; decreased strength	    Gait Skills:     · Level of [place]	minimum assist (75% patients effort)	  · Physical Assist/Nonphysical Assist	1 person assist; verbal cues	  · Weight-Bearing Restrictions	full weight-bearing	  · Assistive Device	rolling walker	  · Gait Distance	5 sidesteps x2, 3 forward steps, 3 backward steps, +bed <> commode	    Gait Analysis:     · Gait Pattern Used	3-point gait	  · Gait Deviations Noted	decreased yandel; decreased weight-shifting ability; increased time in double stance; decreased stride length; decreased step length	  · Impairments Contributing to Gait Deviations	impaired balance; decreased strength	    Stair Negotiation:     · Level of [place]	TBA	    Balance Skills Assessment:     · Sitting Balance: Static	good balance	  · Sitting Balance: Dynamic	good minus	  · Sit-to-Stand Balance	fair plus	  · Standing Balance: Static	good minus	  · Standing Balance: Dynamic	fair plus	  · Systems Impairment Contributing to Balance Disturbance	musculoskeletal	  · Identified Impairments Contributing to Balance Disturbance	decreased strength; impaired postural control	    Sensory Examination:   Sensory Examination:    Light Touch Sensation:   · Left UE	within normal limits	  · Right UE	within normal limits	  · Left LE	within normal limits	  · Right LE	within normal limits	  · Head/Neck	within normal limits	  · Trunk	within normal limits	      Clinical Impressions:   · Criteria for Skilled Therapeutic Interventions	impairments found; functional limitations in following categories; risk reduction/prevention; rehab potential; therapy frequency; anticipated discharge recommendation; predicted duration of therapy intervention	  · Impairments Found (describe specific impairments)	aerobic capacity/endurance; ventilation and respiration/gas exchange; gait, locomotion, and balance; ergonomics and body mechanics; ROM; posture	  · Functional Limitations in Following Categories (describe specific limitations)	self-care; home management; community/leisure	  · Risk Reduction/Prevention (Describe Specific Areas of risk reduction/prevention)	risk factors	  · Risk Areas	fall; safety awareness	  · Rehab Potential	good, to achieve stated therapy goals	  · Therapy Frequency	3-5x/week	  · Predicted Duration of Therapy Intervention (days/wks)	2 weeks	        PM&R Impression: as above    Disposition Plan Recommendations: subacute rehab placement, accepted to [facility]

## 2019-02-12 NOTE — DISCHARGE NOTE ADULT - MEDICATION SUMMARY - MEDICATIONS TO STOP TAKING
I will STOP taking the medications listed below when I get home from the hospital:    atenolol 100 mg oral tablet  -- 1  by mouth 2 times a day    hydroCHLOROthiazide 12.5 mg oral tablet  -- 1 tab(s) by mouth once a day

## 2019-02-12 NOTE — PROGRESS NOTE ADULT - ASSESSMENT
74 yo F with a PMH of lumbar herniated discs, HTN, allergic sinusitis (takes claritin), b/l knee OA who initially presented from home w/ b/l LE weakness, found to be in respiratory distress, meeting severe sepsis criteria (T 103, WBC 34.96, 33% bands and Lactic acid of 2.5), UA grossly positive, and CXR with questionable R sided consolidation, treated with Cef/Azithro, BCx growing pan-sensitive E.coli

## 2019-02-12 NOTE — PROGRESS NOTE ADULT - SUBJECTIVE AND OBJECTIVE BOX
INTERVAL HPI/OVERNIGHT EVENTS:    MEDICATIONS  (STANDING):  dextrose 5%. 1000 milliLiter(s) (50 mL/Hr) IV Continuous <Continuous>  dextrose 50% Injectable 12.5 Gram(s) IV Push once  dextrose 50% Injectable 25 Gram(s) IV Push once  dextrose 50% Injectable 25 Gram(s) IV Push once  furosemide    Tablet 20 milliGRAM(s) Oral daily  heparin  Injectable 5000 Unit(s) SubCutaneous every 8 hours  insulin glargine Injectable (LANTUS) 15 Unit(s) SubCutaneous at bedtime  insulin lispro (HumaLOG) corrective regimen sliding scale   SubCutaneous Before meals and at bedtime  insulin lispro Injectable (HumaLOG) 6 Unit(s) SubCutaneous three times a day before meals  loratadine 10 milliGRAM(s) Oral daily  pantoprazole    Tablet 40 milliGRAM(s) Oral before breakfast  trimethoprim  160 mG/sulfamethoxazole 800 mG 1 Tablet(s) Oral every 12 hours    MEDICATIONS  (PRN):  dextrose 40% Gel 15 Gram(s) Oral once PRN Blood Glucose LESS THAN 70 milliGRAM(s)/deciliter  glucagon  Injectable 1 milliGRAM(s) IntraMuscular once PRN Glucose LESS THAN 70 milligrams/deciliter  ondansetron Injectable 4 milliGRAM(s) IV Push every 8 hours PRN Nausea and/or Vomiting      PHYSICAL EXAM  Vital Signs Last 24 Hrs  T(C): 36.6 (2019 05:53), Max: 36.7 (2019 23:03)  T(F): 97.9 (2019 05:53), Max: 98.1 (2019 23:03)  HR: 79 (2019 05:53) (73 - 85)  BP: 173/83 (2019 05:53) (157/81 - 176/74)  BP(mean): --  RR: 18 (2019 05:53) (17 - 18)  SpO2: 98% (2019 05:53) (97% - 98%)    Constitutional: wn/wd in NAD.   HEENT: NCAT, MMM, OP clear, EOMI, , no proptosis or lid retraction  Neck: no thyromegaly or palpable thyroid nodules   Respiratory: CTA bilaterally. No wheezing or crackles heard   Cardiovascular: regular rhythm, normal S1 and S2, no audible murmurs, no peripheral edema  GI: soft, NT/ND, no masses/HSM appreciated.  Neurology: no tremors, DTR 2+  Skin: no visible rashes/lesions  Psychiatric: AAO x 3, normal affect/mood.  Ext: radial pulses intact, DP pulses intact, extremities warm, no cyanosis, clubbing or edema.     LABS:                        11.6   28.28 )-----------( 120      ( 2019 05:36 )             35.0     0211    139  |  101  |  37<H>  ----------------------------<  169<H>  3.1<L>   |  28  |  0.90    Ca    8.4      2019 05:36  Mg     2.2           Thyroid Stimulating Hormone, Serum: 0.309 uIU/mL ( @ 15:43)      HbA1C: 6.6 % ( @ 06:39)    CAPILLARY BLOOD GLUCOSE      POCT Blood Glucose.: 131 mg/dL (2019 08:28)  POCT Blood Glucose.: 131 mg/dL (2019 22:02)  POCT Blood Glucose.: 97 mg/dL (2019 17:25)  POCT Blood Glucose.: 163 mg/dL (2019 12:43)    FSG & Insulin received:    Yesterday:  pre-dinner fs  No nutritional or correctional given   bedtime fs  15 u lantus     Today:  pre-breakfast fs  nutritional lispro 6 units  pre-lunch fsg:  nutritional lispro   units+   units lispro SS    *** INCOMPLETE NOTE***   A/P:74 yo F with a PMH of lumbar herniated discs, HTN, allergic sinusitis, b/l knee OA who initially presented from home w/ b/l LE weakness, found to be severely septic 2/2 E coli bacteremia 2/2 UTI. Pt initially treated with prednisone for respiratory distress which was later dc'd 2/2 hyperglycemia. Endocrine consulted for increased insulin requirements given no diabetic hx    1. Type 2 DM  A1c of 6.6 on this admission. A1c of 7 in September   Pt noted to be hyperglycemic with increased insulin requirements most likely due to steroid use as well as sepsis. Glucose has been 130's today and she has not required any correctional insulin   Decrease lantus to units at night.  Decrease lispro to units before each meal.  Please continue lispro moderate dose sliding scale four times daily with meals and at bedtime. Pt will most likely need decrease in insulin tomorrow. Will continue to follow along.     Case discussed with Dr. Olson and primary team. INTERVAL HPI/OVERNIGHT EVENTS: Pt switched to bactrim from ceftriaxone. Bladder scan with no urinary retention. Pt complaining of abdominal pain this morning. Pt had not eaten any of her breakfast. She had "premium protein" at her bedside that she had this AM.    MEDICATIONS  (STANDING):  dextrose 5%. 1000 milliLiter(s) (50 mL/Hr) IV Continuous <Continuous>  dextrose 50% Injectable 12.5 Gram(s) IV Push once  dextrose 50% Injectable 25 Gram(s) IV Push once  dextrose 50% Injectable 25 Gram(s) IV Push once  furosemide    Tablet 20 milliGRAM(s) Oral daily  heparin  Injectable 5000 Unit(s) SubCutaneous every 8 hours  insulin glargine Injectable (LANTUS) 15 Unit(s) SubCutaneous at bedtime  insulin lispro (HumaLOG) corrective regimen sliding scale   SubCutaneous Before meals and at bedtime  insulin lispro Injectable (HumaLOG) 6 Unit(s) SubCutaneous three times a day before meals  loratadine 10 milliGRAM(s) Oral daily  pantoprazole    Tablet 40 milliGRAM(s) Oral before breakfast  trimethoprim  160 mG/sulfamethoxazole 800 mG 1 Tablet(s) Oral every 12 hours    MEDICATIONS  (PRN):  dextrose 40% Gel 15 Gram(s) Oral once PRN Blood Glucose LESS THAN 70 milliGRAM(s)/deciliter  glucagon  Injectable 1 milliGRAM(s) IntraMuscular once PRN Glucose LESS THAN 70 milligrams/deciliter  ondansetron Injectable 4 milliGRAM(s) IV Push every 8 hours PRN Nausea and/or Vomiting      PHYSICAL EXAM  Vital Signs Last 24 Hrs  T(C): 36.6 (2019 05:53), Max: 36.7 (2019 23:03)  T(F): 97.9 (2019 05:53), Max: 98.1 (2019 23:03)  HR: 79 (2019 05:53) (73 - 85)  BP: 173/83 (2019 05:53) (157/81 - 176/74)  BP(mean): --  RR: 18 (2019 05:53) (17 - 18)  SpO2: 98% (2019 05:53) (97% - 98%)    Constitutional: wn/wd in NAD.   HEENT: NCAT, MMM, OP clear, EOMI, , no proptosis or lid retraction  Neck: no thyromegaly or palpable thyroid nodules   Respiratory: CTA bilaterally. No wheezing or crackles heard   Cardiovascular: regular rhythm, normal S1 and S2, no audible murmurs, no peripheral edema  GI: soft, non-distended abdomen with suprapubic tenderness  Neurology: no tremors, DTR 2+  Skin: no visible rashes/lesions  Psychiatric: AAO x 3, normal affect/mood.  Ext: radial pulses intact, DP pulses intact, extremities warm, no cyanosis, clubbing or edema.     LABS:                        11.6   28.28 )-----------( 120      ( 2019 05:36 )             35.0     02-    139  |  101  |  37<H>  ----------------------------<  169<H>  3.1<L>   |  28  |  0.90    Ca    8.4      2019 05:36  Mg     2.2           Thyroid Stimulating Hormone, Serum: 0.309 uIU/mL ( @ 15:43)      HbA1C: 6.6 % ( @ 06:39)    CAPILLARY BLOOD GLUCOSE      POCT Blood Glucose.: 131 mg/dL (2019 08:28)  POCT Blood Glucose.: 131 mg/dL (2019 22:02)  POCT Blood Glucose.: 97 mg/dL (2019 17:25)  POCT Blood Glucose.: 163 mg/dL (2019 12:43)    FSG & Insulin received:    Yesterday:  pre-dinner fs  No nutritional or correctional given   bedtime fs  15 u lantus     Today:  pre-breakfast fs  nutritional lispro 6 units  pre-lunch fsg:  nutritional lispro   units+   units lispro SS    *** INCOMPLETE NOTE***   A/P:72 yo F with a PMH of lumbar herniated discs, HTN, allergic sinusitis, b/l knee OA who initially presented from home w/ b/l LE weakness, found to be severely septic 2/2 E coli bacteremia 2/2 UTI. Pt initially treated with prednisone for respiratory distress which was later dc'd 2/2 hyperglycemia. Endocrine consulted for increased insulin requirements given no diabetic hx    1. Type 2 DM  A1c of 6.6 on this admission. A1c of 7 in September   Pt noted to be hyperglycemic with increased insulin requirements most likely due to steroid use as well as sepsis. Glucose has been 130's today and she has not required any correctional insulin   Decrease lantus to units at night.  Decrease lispro to units before each meal.  Please continue lispro moderate dose sliding scale four times daily with meals and at bedtime. Pt will most likely need decrease in insulin tomorrow. Will continue to follow along.     Case discussed with Dr. Olson and primary team. INTERVAL HPI/OVERNIGHT EVENTS: Pt switched to bactrim from ceftriaxone. Bladder scan with no urinary retention. Pt complaining of abdominal pain this morning. Pt had not eaten any of her breakfast. She had "premium protein" at her bedside that she had this AM.    MEDICATIONS  (STANDING):  dextrose 5%. 1000 milliLiter(s) (50 mL/Hr) IV Continuous <Continuous>  dextrose 50% Injectable 12.5 Gram(s) IV Push once  dextrose 50% Injectable 25 Gram(s) IV Push once  dextrose 50% Injectable 25 Gram(s) IV Push once  furosemide    Tablet 20 milliGRAM(s) Oral daily  heparin  Injectable 5000 Unit(s) SubCutaneous every 8 hours  insulin glargine Injectable (LANTUS) 15 Unit(s) SubCutaneous at bedtime  insulin lispro (HumaLOG) corrective regimen sliding scale   SubCutaneous Before meals and at bedtime  insulin lispro Injectable (HumaLOG) 6 Unit(s) SubCutaneous three times a day before meals  loratadine 10 milliGRAM(s) Oral daily  pantoprazole    Tablet 40 milliGRAM(s) Oral before breakfast  trimethoprim  160 mG/sulfamethoxazole 800 mG 1 Tablet(s) Oral every 12 hours    MEDICATIONS  (PRN):  dextrose 40% Gel 15 Gram(s) Oral once PRN Blood Glucose LESS THAN 70 milliGRAM(s)/deciliter  glucagon  Injectable 1 milliGRAM(s) IntraMuscular once PRN Glucose LESS THAN 70 milligrams/deciliter  ondansetron Injectable 4 milliGRAM(s) IV Push every 8 hours PRN Nausea and/or Vomiting      PHYSICAL EXAM  Vital Signs Last 24 Hrs  T(C): 36.6 (2019 05:53), Max: 36.7 (2019 23:03)  T(F): 97.9 (2019 05:53), Max: 98.1 (2019 23:03)  HR: 79 (2019 05:53) (73 - 85)  BP: 173/83 (2019 05:53) (157/81 - 176/74)  BP(mean): --  RR: 18 (2019 05:53) (17 - 18)  SpO2: 98% (2019 05:53) (97% - 98%)    Constitutional: wn/wd in NAD.   HEENT: NCAT, MMM, OP clear, EOMI, , no proptosis or lid retraction  Neck: no thyromegaly or palpable thyroid nodules   Respiratory: CTA bilaterally. No wheezing or crackles heard   Cardiovascular: regular rhythm, normal S1 and S2, no audible murmurs, no peripheral edema  GI: soft, non-distended abdomen with suprapubic tenderness  Neurology: no tremors, DTR 2+  Skin: no visible rashes/lesions  Psychiatric: AAO x 3, normal affect/mood.  Ext: radial pulses intact, DP pulses intact, extremities warm, no cyanosis, clubbing or edema.     LABS:                        11.6   28.28 )-----------( 120      ( 2019 05:36 )             35.0     02-    139  |  101  |  37<H>  ----------------------------<  169<H>  3.1<L>   |  28  |  0.90    Ca    8.4      2019 05:36  Mg     2.2           Thyroid Stimulating Hormone, Serum: 0.309 uIU/mL ( @ 15:43)      HbA1C: 6.6 % ( @ 06:39)    CAPILLARY BLOOD GLUCOSE      POCT Blood Glucose.: 131 mg/dL (2019 08:28)  POCT Blood Glucose.: 131 mg/dL (2019 22:02)  POCT Blood Glucose.: 97 mg/dL (2019 17:25)  POCT Blood Glucose.: 163 mg/dL (2019 12:43)    FSG & Insulin received:    Yesterday:  pre-dinner fs  No nutritional or correctional given   bedtime fs  15 u lantus     Today:  pre-breakfast fs  nutritional lispro 6 units  pre-lunch fs  nutritional lispro 6 units    A/P:72 yo F with a PMH of lumbar herniated discs, HTN, allergic sinusitis, b/l knee OA who initially presented from home w/ b/l LE weakness, found to be severely septic 2/2 E coli bacteremia 2/2 UTI. Pt initially treated with prednisone for respiratory distress which was later dc'd 2/2 hyperglycemia. Endocrine consulted for increased insulin requirements given no diabetic hx    1. Type 2 DM  A1c of 6.6 on this admission. A1c of 7 in September   Pt noted to be hyperglycemic with increased insulin requirements most likely due to steroid use as well as sepsis. Glucose has been 130's today and she has not required any correctional insulin   Pt being discharged to [facility] today. D/C lantus and premeal insulin. For discharge, start pt on metformin 500mg BID, Januvia 100mg and SSI   Pt will follow up next week in clinic     Case discussed with Dr. Olson and primary team.

## 2019-02-12 NOTE — PROGRESS NOTE ADULT - PROBLEM SELECTOR PLAN 1
-2/2 e coli bacteremia 2/2 likely UTI,  -c/w ceftriaxone, d/c azithro  -surveillance cultures
2/2 E.coli bacteremia 2/2 UTI. Patient presenting meeting severe sepsis (T 103, WBC 34.96, 33% bands and Lactic acid of 2.5). CXR with potential small right sided consolidation. RVP negative. UA positive. s/p 1.5L NS bolus, Ceft/Azithro x 1 in the ED.   -BCx: gram negative rods-E.coli, f/u sensitivities  -f/u surveillance cultures  -UCx: mixed growth  -c/w Ceftriaxone 2g Q24h (2/10- )
2/2 E.coli bacteremia 2/2 UTI. Patient presenting meeting severe sepsis (T 103, WBC 34.96, 33% bands and Lactic acid of 2.5). CXR with potential small right sided consolidation. RVP negative. UA positive. s/p 1.5L NS bolus, Ceft/Azithro, Ceftriaxone 2g Q24h (2/10)  -BCx: gram negative rods-pansensitive E.coli  -surveillance cultures (2/10): NGTD  -UCx: mixed growth  -transitioned to Bactrim -800mg BID x 6d (2/11 - )
2/2 E.coli bacteremia 2/2 UTI. Resolving. Patient presenting meeting severe sepsis (T 103, WBC 34.96, 33% bands and Lactic acid of 2.5). CXR with potential small right sided consolidation. RVP negative. UA positive. s/p 1.5L NS bolus, Ceft/Azithro, Ceftriaxone 2g Q24h (2/10)  -BCx: gram negative rods-pansensitive E.coli  -surveillance cultures (2/10): NGTD  -UCx: mixed growth  -c/w Bactrim -800mg BID x 6d (2/11 - 2/16)

## 2019-02-12 NOTE — DISCHARGE NOTE ADULT - OTHER SIGNIFICANT FINDINGS
Echocardiogram (02.09.19 @ 13:45)   Summary  Normal left ventricular size and wall thickness.The left ventricular wall   motion is normal.The left ventricular ejection fraction is 60%.The left   atrial size is normal. The right atrium is mildly dilated.The right ventricle is   normal in size and function.The mitral inflow pattern is consistent with   pseudo-normalization, suggestive of moderately elevated left atrial   pressure (15-20mmHg).  There is mild to moderate aortic regurgitation.There is   mild mitral regurgitation.There is mild tricuspid regurgitation.There ismild   pulmonary hypertension. The pulmonary artery systolic pressure is   estimated to be 41 mmHg.  There is mild pulmonic regurgitation.No aortic root   dilatation.The inferior vena cava is normal in size (<2.1 cm) with normal   inspiratory collapse (>50%) consistent with normal right atrial pressure.   There is no pericardial effusion.

## 2019-02-12 NOTE — PROGRESS NOTE ADULT - PROBLEM SELECTOR PROBLEM 8
Chronic bilateral low back pain, with sciatica presence unspecified
Essential hypertension

## 2019-02-12 NOTE — DISCHARGE NOTE ADULT - SECONDARY DIAGNOSIS.
Urinary tract infection without hematuria, site unspecified Acute on chronic diastolic congestive heart failure Chronic bilateral low back pain, with sciatica presence unspecified Hyperglycemia Essential hypertension Diabetes

## 2019-02-12 NOTE — PROGRESS NOTE ADULT - ATTENDING COMMENTS
I was physically present for the key portions of the evaluation and managemnent (E/M) service provided.  I agree with the above history, physical, and plan which I have reviewed and edited where appropriate, with the exceptions as per my note.    Pt seen and examined 2/11/19. Date of service 2/11/19.    Pt had 2wks of worsened LE weakness on top of ~1y of progressive LE weakness.     Exam  perrl, eomi, face symm, tup ml, no nystag, vff  5/5 throughout in UEs. 4/5 in both hip flexors, distally 5/5.  sensory function largely intact.  dtrs diminished throughout.  coor intact to FTN and HTS  gait deferred.    Found to have ecoli bacteremia.    MRI L spine with mild disc dz but no compression.    AP: Acute worsening of leg weakness and overall weakness likely 2/2 bacteremia, now somewhat improved. Chronically progressive weakness may be related to LE process and would require outpatient EMG to r/o myopathy vs neuropathy such as CIDP. To f/u with neurology, ideally neuromuscular neurology, for EMG.
Pt seen on rounds this afternoon.  Glucoses have been satisfactory, nearly all in the 130-140 range despite tapering of her insulin.  In view of her admitting A1c level of 6.6% without therapy, would anticipate that she could return to control with oral agents.  Would therefore discontinue the insulin and start metformin (500 mg BID intially, doses on a full stomach), plus Januvia.  Would hopefully be able to have her follow up at [facility] for further diabetic education, starting of fingerstick monitoring, etc
Pt seen and examined by me at bedside earlier in AM. Agree with housestaff's exam/a/p as noted above with additions,   c/o lower abd pain, last BM yesterday-minimum, no diarrhea/?nausea/no vomiting.  VSS, exam as above with additions,   +bs/mild TTP on deep palpation of suprapubic and L quadrant.   labs reviewed   a/p:  1. Abd pain-abd exam relatively benign, ?constipation, start bowel regimen.   2. Bacteremia 2/2 UTI-on po bactrim to complete total of 2 weeks  3. Leukocytosis-downtrending.   4. DMII-FS reviewed, insulin as per endo.  rest of a/p as above.

## 2019-02-12 NOTE — DISCHARGE NOTE ADULT - CARE PROVIDER_API CALL
Endocrine, Clinic  [address]  [place], [place] [address]  Phone: [phone]  Fax: (   )    -  Follow Up Time: 2 weeks

## 2019-02-12 NOTE — PROGRESS NOTE ADULT - PROBLEM SELECTOR PROBLEM 2
Bacteremia due to Escherichia coli
Respiratory distress
Bacteremia due to Escherichia coli
Bacteremia due to Escherichia coli

## 2019-02-12 NOTE — PROGRESS NOTE ADULT - ASSESSMENT
74 yo F with a PMH of lumbar herniated discs, HTN, allergic sinusitis (takes claritin), b/l knee OA who initially presented from home w/ b/l LE weakness, found to be in respiratory distress, meeting severe sepsis criteria (T 103, WBC 34.96, 33% bands and Lactic acid of 2.5), UA grossly positive, and CXR with questionable R sided consolidation, treated with Cef/Azithro, BCx growing pan-sensitive E.coli    Problem/Plan - 1:  ·  Problem: Severe sepsis.  Plan: 2/2 E.coli bacteremia 2/2 UTI. Resolving. Patient presenting meeting severe sepsis (T 103, WBC 34.96, 33% bands and Lactic acid of 2.5). CXR with potential small right sided consolidation. RVP negative. UA positive. s/p 1.5L NS bolus, Ceft/Azithro, Ceftriaxone 2g Q24h (2/10)  -BCx: gram negative rods-pansensitive E.coli  -surveillance cultures (2/10): NGTD  -UCx: mixed growth  -c/w Bactrim -800mg BID x 6d (2/11 - 2/16).     Problem/Plan - 2:  ·  Problem: Bacteremia due to Escherichia coli.  Plan: Patient presenting meeting severe sepsis criteria, found to have E. coli bacteremia, likely 2/2 UTI given UA positive on admission  -BCx growing pan-sensitive E. coli   -c/w Bactrim 160-800mg BID (2/11- 2/16)  -management as above    #UTI  UA positive with large LE, positive Nitrites, many WBC and bacteria  -management as above.     Problem/Plan - 3:  ·  Problem: Acute diastolic congestive heart failure.  Plan: Patient with no hx of CHF, presented with w/ acute change in respiratory status with audible wheezing that improved s/p Duonebs x3, Solumedrol 125mg x1 and Mg 2g. Found to have elevated BNP >2000, b/l LE edema  -ABG consistent with respiratory alkalosis   -Duonebs q6hrs standing   -ECHO: EF 60%, mildly dilated RA, moderate AR, mild MR, sPAP 41mmHg  -CXR with congestion  -c/w Lasix 20mg PO qd    #Respiratory distress  Resolved. Patient w/ acute change in respiratory status with audible wheezing that improved s/p Duonebs x3, Solumedrol 125mg x1 and Mg 2g. No reported hx of Asthma, COPD or Tobacco use. Etiology of reactive airway likely secondary to infectious lung process vs bronchospasm of unknown etiology vs acute CHF given pt w/ b/l LE edema and BNP >2000  -ABG consistent with respiratory alkalosis   -echo w/ dCHF, edema on exam.     Problem/Plan - 4:  ·  Problem: Lower abdominal pain.  Plan: Patient reports acute onset lower abdominal pain, described as an achy 10/10 pain, patient with reported urinary frequency, bladder scan this am 86cc. Patient reports minimal BM yesterday, pain likely 2/2 constipation  -trial of BM regimen: colace, senna, miralax  -continue to monitor.     Problem/Plan - 5:  ·  Problem: Lower extremity weakness.  Plan: Patient presented w/ b/l LE weakness and difficulty standing from commode. Patient w/ chronic lower back pain for which MRI was negative for acute signs of cord compression and only notable for disc protrusion in L3-L4, L4-L5, L5-S1. Xray lumbar spine normal. Etiology likely in the setting of infection vs. electrolyte/metabolic given pt w/ hypokalemia on admission and elevated CK of 246 vs. myopathy.   -Neurology following --appreciate recs   -MRI L-spine: no cord compression  -Neurology recommend EMG as outpatient  -TSH: 0.309, free T4 0.92, ESR 93  -B12 >2000, Folate 13.6  -RPR negative  -f/u Aldolase    #Chronic b/l lower back pain  -pain medications prn  -follow up MRI C and T spine per neuro recs  -management as above.    Problem/Plan - 6:  Problem: Hyperglycemia. Plan: Patient denies a hx of DM, reports that A1c as outpatient was 7, however she wasn't on any medications, now with elevated FSG in the setting of steroids vs infection.   - A1c: 6.6 (2/9/19)  - Prednisone discontinued on 2/10  -FSG currently controlled  - c/w Lantus 15u and lispro 6u TIDAC  - mISS   - monitor FSG  - Endo on board, follow recs.    Problem/Plan - 7:  ·  Problem: Hypokalemia.  Plan: Patient w/ potassium of 2.7 upon admission.   -s/p 40meq PO x2 in the ED, repeat BMP w/ K of 2.9, s/p 10meq x3  -K 3.1 on 2/11, given 40mEq x 3  -replete PRN.

## 2019-02-12 NOTE — DISCHARGE NOTE ADULT - PROVIDER TOKENS
FREE:[LAST:[Endocrine],FIRST:[Clinic],PHONE:[phone]],FAX:[(   )    -],ADDRESS:[[address] [place] [address], [address] [place] [address]],FOLLOWUP:[2 weeks]]

## 2019-02-12 NOTE — PROGRESS NOTE ADULT - PROBLEM SELECTOR PLAN 4
Patient reports acute onset lower abdominal pain, described as an achy 10/10 pain, patient with reported urinary frequency, bladder scan this am 86cc. Patient reports minimal BM yesterday, pain likely 2/2 constipation  -trial of BM regimen: colace, senna, miralax  -continue to monitor

## 2019-02-12 NOTE — PROGRESS NOTE ADULT - PROBLEM SELECTOR PLAN 2
Patient presenting meeting severe sepsis criteria, found to have E. coli bacteremia, likely 2/2 UTI given UA positive on admission  -BCx growing pan-sensitive E. coli   -c/w Bactrim 160-800mg BID (2/11- 2/16)  -management as above    #UTI  UA positive with large LE, positive Nitrites, many WBC and bacteria  -management as above

## 2019-02-14 DIAGNOSIS — I50.33 ACUTE ON CHRONIC DIASTOLIC (CONGESTIVE) HEART FAILURE: ICD-10-CM

## 2019-02-14 DIAGNOSIS — E11.9 TYPE 2 DIABETES MELLITUS WITHOUT COMPLICATIONS: ICD-10-CM

## 2019-02-14 DIAGNOSIS — R65.20 SEVERE SEPSIS WITHOUT SEPTIC SHOCK: ICD-10-CM

## 2019-02-14 DIAGNOSIS — N39.0 URINARY TRACT INFECTION, SITE NOT SPECIFIED: ICD-10-CM

## 2019-02-14 DIAGNOSIS — M54.32 SCIATICA, LEFT SIDE: ICD-10-CM

## 2019-02-14 DIAGNOSIS — E87.3 ALKALOSIS: ICD-10-CM

## 2019-02-14 DIAGNOSIS — M54.31 SCIATICA, RIGHT SIDE: ICD-10-CM

## 2019-02-14 DIAGNOSIS — E87.6 HYPOKALEMIA: ICD-10-CM

## 2019-02-14 DIAGNOSIS — I11.0 HYPERTENSIVE HEART DISEASE WITH HEART FAILURE: ICD-10-CM

## 2019-02-14 DIAGNOSIS — A41.51 SEPSIS DUE TO ESCHERICHIA COLI [E. COLI]: ICD-10-CM

## 2019-02-15 LAB
CULTURE RESULTS: SIGNIFICANT CHANGE UP
SPECIMEN SOURCE: SIGNIFICANT CHANGE UP

## 2019-02-20 ENCOUNTER — RESULT CHARGE (OUTPATIENT)
Age: 74
End: 2019-02-20

## 2019-02-20 ENCOUNTER — APPOINTMENT (OUTPATIENT)
Dept: ENDOCRINOLOGY | Facility: CLINIC | Age: 74
End: 2019-02-20
Payer: COMMERCIAL

## 2019-02-20 VITALS
BODY MASS INDEX: 35.51 KG/M2 | HEART RATE: 113 BPM | WEIGHT: 220 LBS | SYSTOLIC BLOOD PRESSURE: 175 MMHG | DIASTOLIC BLOOD PRESSURE: 86 MMHG

## 2019-02-20 DIAGNOSIS — R73.09 OTHER ABNORMAL GLUCOSE: ICD-10-CM

## 2019-02-20 LAB
GLUCOSE BLDC GLUCOMTR-MCNC: 162
HBA1C MFR BLD HPLC: 6.2

## 2019-02-20 PROCEDURE — 82962 GLUCOSE BLOOD TEST: CPT

## 2019-02-20 PROCEDURE — 99211 OFF/OP EST MAY X REQ PHY/QHP: CPT | Mod: 25

## 2019-02-20 PROCEDURE — 83036 HEMOGLOBIN GLYCOSYLATED A1C: CPT | Mod: QW

## 2019-02-26 PROCEDURE — 83036 HEMOGLOBIN GLYCOSYLATED A1C: CPT

## 2019-02-26 PROCEDURE — 87581 M.PNEUMON DNA AMP PROBE: CPT

## 2019-02-26 PROCEDURE — 72100 X-RAY EXAM L-S SPINE 2/3 VWS: CPT

## 2019-02-26 PROCEDURE — 84439 ASSAY OF FREE THYROXINE: CPT

## 2019-02-26 PROCEDURE — 93306 TTE W/DOPPLER COMPLETE: CPT

## 2019-02-26 PROCEDURE — 82746 ASSAY OF FOLIC ACID SERUM: CPT

## 2019-02-26 PROCEDURE — 71045 X-RAY EXAM CHEST 1 VIEW: CPT

## 2019-02-26 PROCEDURE — 97161 PT EVAL LOW COMPLEX 20 MIN: CPT

## 2019-02-26 PROCEDURE — 82085 ASSAY OF ALDOLASE: CPT

## 2019-02-26 PROCEDURE — 72148 MRI LUMBAR SPINE W/O DYE: CPT

## 2019-02-26 PROCEDURE — 83605 ASSAY OF LACTIC ACID: CPT

## 2019-02-26 PROCEDURE — 87150 DNA/RNA AMPLIFIED PROBE: CPT

## 2019-02-26 PROCEDURE — 80074 ACUTE HEPATITIS PANEL: CPT

## 2019-02-26 PROCEDURE — 82550 ASSAY OF CK (CPK): CPT

## 2019-02-26 PROCEDURE — 82803 BLOOD GASES ANY COMBINATION: CPT

## 2019-02-26 PROCEDURE — 84484 ASSAY OF TROPONIN QUANT: CPT

## 2019-02-26 PROCEDURE — 84295 ASSAY OF SERUM SODIUM: CPT

## 2019-02-26 PROCEDURE — 81001 URINALYSIS AUTO W/SCOPE: CPT

## 2019-02-26 PROCEDURE — 86803 HEPATITIS C AB TEST: CPT

## 2019-02-26 PROCEDURE — 83880 ASSAY OF NATRIURETIC PEPTIDE: CPT

## 2019-02-26 PROCEDURE — 94640 AIRWAY INHALATION TREATMENT: CPT

## 2019-02-26 PROCEDURE — 85025 COMPLETE CBC W/AUTO DIFF WBC: CPT

## 2019-02-26 PROCEDURE — 85610 PROTHROMBIN TIME: CPT

## 2019-02-26 PROCEDURE — 99291 CRITICAL CARE FIRST HOUR: CPT

## 2019-02-26 PROCEDURE — 87486 CHLMYD PNEUM DNA AMP PROBE: CPT

## 2019-02-26 PROCEDURE — 85652 RBC SED RATE AUTOMATED: CPT

## 2019-02-26 PROCEDURE — 80053 COMPREHEN METABOLIC PANEL: CPT

## 2019-02-26 PROCEDURE — 86780 TREPONEMA PALLIDUM: CPT

## 2019-02-26 PROCEDURE — 80048 BASIC METABOLIC PNL TOTAL CA: CPT

## 2019-02-26 PROCEDURE — 82553 CREATINE MB FRACTION: CPT

## 2019-02-26 PROCEDURE — 83735 ASSAY OF MAGNESIUM: CPT

## 2019-02-26 PROCEDURE — 84132 ASSAY OF SERUM POTASSIUM: CPT

## 2019-02-26 PROCEDURE — 84443 ASSAY THYROID STIM HORMONE: CPT

## 2019-02-26 PROCEDURE — 85730 THROMBOPLASTIN TIME PARTIAL: CPT

## 2019-02-26 PROCEDURE — 36415 COLL VENOUS BLD VENIPUNCTURE: CPT

## 2019-02-26 PROCEDURE — 87186 SC STD MICRODIL/AGAR DIL: CPT

## 2019-02-26 PROCEDURE — 82330 ASSAY OF CALCIUM: CPT

## 2019-02-26 PROCEDURE — 86140 C-REACTIVE PROTEIN: CPT

## 2019-02-26 PROCEDURE — 87633 RESP VIRUS 12-25 TARGETS: CPT

## 2019-02-26 PROCEDURE — 82962 GLUCOSE BLOOD TEST: CPT

## 2019-02-26 PROCEDURE — 85027 COMPLETE CBC AUTOMATED: CPT

## 2019-02-26 PROCEDURE — 87086 URINE CULTURE/COLONY COUNT: CPT

## 2019-02-26 PROCEDURE — 82607 VITAMIN B-12: CPT

## 2019-02-26 PROCEDURE — 87040 BLOOD CULTURE FOR BACTERIA: CPT

## 2019-02-26 PROCEDURE — 87798 DETECT AGENT NOS DNA AMP: CPT

## 2019-02-27 ENCOUNTER — APPOINTMENT (OUTPATIENT)
Dept: INTERNAL MEDICINE | Facility: CLINIC | Age: 74
End: 2019-02-27

## 2019-03-20 ENCOUNTER — APPOINTMENT (OUTPATIENT)
Dept: INTERNAL MEDICINE | Facility: CLINIC | Age: 74
End: 2019-03-20

## 2019-03-28 ENCOUNTER — APPOINTMENT (OUTPATIENT)
Dept: ENDOCRINOLOGY | Facility: CLINIC | Age: 74
End: 2019-03-28

## 2019-05-31 ENCOUNTER — OTHER (OUTPATIENT)
Age: 74
End: 2019-05-31

## 2019-05-31 ENCOUNTER — RX RENEWAL (OUTPATIENT)
Age: 74
End: 2019-05-31

## 2019-05-31 NOTE — H&P ADULT - PROBLEM SELECTOR PLAN 9
no known allergies
1) PCP Contacted on Admission: (Y/N) --> Name & Phone #: Dr. Garcia   2) Date of Contact with PCP:  3) PCP Contacted at Discharge: (Y/N, N/A)  4) Summary of Handoff Given to PCP:   5) Post-Discharge Appointment Date and Location:

## 2019-06-05 ENCOUNTER — OTHER (OUTPATIENT)
Age: 74
End: 2019-06-05

## 2019-06-06 ENCOUNTER — OTHER (OUTPATIENT)
Age: 74
End: 2019-06-06

## 2019-06-06 ENCOUNTER — RX RENEWAL (OUTPATIENT)
Age: 74
End: 2019-06-06

## 2019-06-06 RX ORDER — METFORMIN ER 500 MG 500 MG/1
500 TABLET ORAL
Qty: 90 | Refills: 1 | Status: DISCONTINUED | COMMUNITY
Start: 2019-05-31 | End: 2019-06-06

## 2019-06-19 ENCOUNTER — APPOINTMENT (OUTPATIENT)
Dept: INTERNAL MEDICINE | Facility: CLINIC | Age: 74
End: 2019-06-19
Payer: MEDICARE

## 2019-06-19 ENCOUNTER — RX RENEWAL (OUTPATIENT)
Age: 74
End: 2019-06-19

## 2019-06-19 VITALS
WEIGHT: 226 LBS | DIASTOLIC BLOOD PRESSURE: 97 MMHG | SYSTOLIC BLOOD PRESSURE: 190 MMHG | HEART RATE: 113 BPM | TEMPERATURE: 98 F | HEIGHT: 66 IN | OXYGEN SATURATION: 98 % | BODY MASS INDEX: 36.32 KG/M2

## 2019-06-19 DIAGNOSIS — D72.829 ELEVATED WHITE BLOOD CELL COUNT, UNSPECIFIED: ICD-10-CM

## 2019-06-19 DIAGNOSIS — I10 ESSENTIAL (PRIMARY) HYPERTENSION: ICD-10-CM

## 2019-06-19 DIAGNOSIS — R73.09 OTHER ABNORMAL GLUCOSE: ICD-10-CM

## 2019-06-19 DIAGNOSIS — R53.1 WEAKNESS: ICD-10-CM

## 2019-06-19 DIAGNOSIS — K21.9 GASTRO-ESOPHAGEAL REFLUX DISEASE W/OUT ESOPHAGITIS: ICD-10-CM

## 2019-06-19 PROCEDURE — 99215 OFFICE O/P EST HI 40 MIN: CPT | Mod: 25

## 2019-06-19 PROCEDURE — 36415 COLL VENOUS BLD VENIPUNCTURE: CPT

## 2019-06-19 RX ORDER — METFORMIN HYDROCHLORIDE 500 MG/1
500 TABLET, COATED ORAL
Qty: 28 | Refills: 0 | Status: DISCONTINUED | COMMUNITY
Start: 2019-06-06 | End: 2019-06-19

## 2019-06-19 RX ORDER — METFORMIN HYDROCHLORIDE 500 MG/1
500 TABLET, COATED ORAL
Qty: 180 | Refills: 4 | Status: ACTIVE | COMMUNITY
Start: 2019-06-19 | End: 1900-01-01

## 2019-06-19 RX ORDER — METOPROLOL TARTRATE 25 MG/1
25 TABLET, FILM COATED ORAL TWICE DAILY
Qty: 180 | Refills: 2 | Status: ACTIVE | COMMUNITY
Start: 2019-06-19 | End: 1900-01-01

## 2019-06-19 RX ORDER — HYDROCHLOROTHIAZIDE 12.5 MG/1
12.5 CAPSULE ORAL DAILY
Qty: 90 | Refills: 3 | Status: DISCONTINUED | COMMUNITY
Start: 2018-09-19 | End: 2019-06-19

## 2019-06-19 RX ORDER — AMLODIPINE BESYLATE 2.5 MG/1
2.5 TABLET ORAL
Qty: 90 | Refills: 0 | Status: DISCONTINUED | COMMUNITY
Start: 2018-01-16 | End: 2019-06-19

## 2019-06-19 RX ORDER — LOSARTAN POTASSIUM 100 MG/1
100 TABLET, FILM COATED ORAL DAILY
Qty: 90 | Refills: 5 | Status: ACTIVE | COMMUNITY
Start: 2019-06-19 | End: 1900-01-01

## 2019-06-19 RX ORDER — METOPROLOL TARTRATE 50 MG/1
50 TABLET, FILM COATED ORAL TWICE DAILY
Qty: 28 | Refills: 0 | Status: DISCONTINUED | COMMUNITY
Start: 2017-08-14 | End: 2019-06-19

## 2019-06-19 RX ORDER — DEXAMETHASONE 1 MG/1
1 TABLET ORAL
Qty: 1 | Refills: 0 | Status: DISCONTINUED | COMMUNITY
Start: 2018-07-05 | End: 2019-06-19

## 2019-06-19 RX ORDER — PANTOPRAZOLE 40 MG/1
40 TABLET, DELAYED RELEASE ORAL DAILY
Qty: 90 | Refills: 4 | Status: ACTIVE | COMMUNITY
Start: 2019-06-19 | End: 1900-01-01

## 2019-06-20 ENCOUNTER — OTHER (OUTPATIENT)
Age: 74
End: 2019-06-20

## 2019-06-20 LAB
ALBUMIN SERPL ELPH-MCNC: 4.3 G/DL
ALP BLD-CCNC: 95 U/L
ALT SERPL-CCNC: 19 U/L
ANION GAP SERPL CALC-SCNC: 17 MMOL/L
AST SERPL-CCNC: 19 U/L
BASOPHILS # BLD AUTO: 0.05 K/UL
BASOPHILS NFR BLD AUTO: 0.5 %
BILIRUB SERPL-MCNC: <0.2 MG/DL
BUN SERPL-MCNC: 41 MG/DL
CALCIUM SERPL-MCNC: 9.7 MG/DL
CHLORIDE SERPL-SCNC: 105 MMOL/L
CHOLEST SERPL-MCNC: 189 MG/DL
CHOLEST/HDLC SERPL: 2.7 RATIO
CO2 SERPL-SCNC: 22 MMOL/L
CREAT SERPL-MCNC: 0.96 MG/DL
CREAT SPEC-SCNC: 51 MG/DL
EOSINOPHIL # BLD AUTO: 0.07 K/UL
EOSINOPHIL NFR BLD AUTO: 0.7 %
ESTIMATED AVERAGE GLUCOSE: 111 MG/DL
GLUCOSE SERPL-MCNC: 115 MG/DL
HBA1C MFR BLD HPLC: 5.5 %
HCT VFR BLD CALC: 44.1 %
HDLC SERPL-MCNC: 70 MG/DL
HGB BLD-MCNC: 13.7 G/DL
IMM GRANULOCYTES NFR BLD AUTO: 0.6 %
LDLC SERPL CALC-MCNC: 103 MG/DL
LYMPHOCYTES # BLD AUTO: 1.54 K/UL
LYMPHOCYTES NFR BLD AUTO: 14.4 %
MAN DIFF?: NORMAL
MCHC RBC-ENTMCNC: 29.2 PG
MCHC RBC-ENTMCNC: 31.1 GM/DL
MCV RBC AUTO: 94 FL
MICROALBUMIN 24H UR DL<=1MG/L-MCNC: 4.6 MG/DL
MICROALBUMIN/CREAT 24H UR-RTO: 91 MG/G
MONOCYTES # BLD AUTO: 0.89 K/UL
MONOCYTES NFR BLD AUTO: 8.3 %
NEUTROPHILS # BLD AUTO: 8.09 K/UL
NEUTROPHILS NFR BLD AUTO: 75.5 %
PLATELET # BLD AUTO: 250 K/UL
POTASSIUM SERPL-SCNC: 4.2 MMOL/L
PROT SERPL-MCNC: 8 G/DL
RBC # BLD: 4.69 M/UL
RBC # FLD: 15.6 %
SODIUM SERPL-SCNC: 144 MMOL/L
TRIGL SERPL-MCNC: 78 MG/DL
WBC # FLD AUTO: 10.7 K/UL

## 2019-06-20 RX ORDER — SITAGLIPTIN 50 MG/1
50 TABLET, FILM COATED ORAL DAILY
Qty: 90 | Refills: 1 | Status: ACTIVE | COMMUNITY
Start: 2019-06-19 | End: 1900-01-01

## 2019-06-20 RX ORDER — BLOOD SUGAR DIAGNOSTIC
STRIP MISCELLANEOUS DAILY
Qty: 90 | Refills: 1 | Status: ACTIVE | COMMUNITY
Start: 2019-06-20 | End: 1900-01-01

## 2019-06-27 ENCOUNTER — OTHER (OUTPATIENT)
Age: 74
End: 2019-06-27

## 2019-06-27 ENCOUNTER — APPOINTMENT (OUTPATIENT)
Dept: ENDOCRINOLOGY | Facility: CLINIC | Age: 74
End: 2019-06-27

## 2019-06-27 RX ORDER — BLOOD SUGAR DIAGNOSTIC
STRIP MISCELLANEOUS DAILY
Qty: 90 | Refills: 1 | Status: ACTIVE | COMMUNITY
Start: 2019-06-27 | End: 1900-01-01

## 2019-07-11 ENCOUNTER — OTHER (OUTPATIENT)
Age: 74
End: 2019-07-11

## 2019-07-11 RX ORDER — LANCETS 33 GAUGE
EACH MISCELLANEOUS
Qty: 1 | Refills: 3 | Status: ACTIVE | COMMUNITY
Start: 2019-06-20 | End: 1900-01-01

## 2019-08-22 ENCOUNTER — OTHER (OUTPATIENT)
Age: 74
End: 2019-08-22

## 2019-09-25 ENCOUNTER — APPOINTMENT (OUTPATIENT)
Dept: INTERNAL MEDICINE | Facility: CLINIC | Age: 74
End: 2019-09-25

## 2019-10-31 NOTE — ED ADULT TRIAGE NOTE - STATUS:
Applied Complex Repair And Dorsal Nasal Flap Text: The defect edges were debeveled with a #15 scalpel blade.  The primary defect was closed partially with a complex linear closure.  Given the location of the remaining defect, shape of the defect and the proximity to free margins a dorsal nasal flap was deemed most appropriate for complete closure of the defect.  Using a sterile surgical marker, an appropriate flap was drawn incorporating the defect and placing the expected incisions within the relaxed skin tension lines where possible.    The area thus outlined was incised deep to adipose tissue with a #15 scalpel blade.  The skin margins were undermined to an appropriate distance in all directions utilizing iris scissors.

## 2019-11-06 ENCOUNTER — RX RENEWAL (OUTPATIENT)
Age: 74
End: 2019-11-06

## 2019-11-06 RX ORDER — FUROSEMIDE 20 MG/1
20 TABLET ORAL DAILY
Qty: 14 | Refills: 0 | Status: ACTIVE | COMMUNITY
Start: 2019-06-19 | End: 1900-01-01

## 2021-06-25 NOTE — PROGRESS NOTE ADULT - PROBLEM SELECTOR PLAN 4
Detail Level: Zone Resolved. Patient w/ acute change in respiratory status with audible wheezing that improved s/p Duonebs x3, Solumedrol 125mg x1 and Mg 2g. No reported hx of Asthma, COPD or Tobacco use. Etiology of reactive airway likely secondary to infectious lung process vs bronchospasm of unknown etiology vs acute CHF given pt w/ b/l LE edema and BNP >2000  -ABG consistent with respiratory alkalosis   -Duonebs q6hrs standing   -echo w/ dCHF, edema on exam, given sepsis cautious w/ IVF  -weaned off NC

## 2022-03-09 NOTE — PATIENT PROFILE ADULT - FUNCTIONAL SCREEN CURRENT LEVEL: COMMUNICATION, MLM
Mom requesting letter be signed for pk to sign for new .  Lorri Gonsales    
0 = understands/communicates without difficulty

## 2022-10-09 NOTE — ED ADULT NURSE REASSESSMENT NOTE - NS ED NURSE REASSESS COMMENT FT1
This RN alerted by MD that pt was found to be suddenly wheezing and tachypneic. Pt given duoneb treatment, MD zavala brought to bedside. Pt moved to peds. BiPap put on standby at the bedside. Cardiac, O2 monitoring applied. At appr 1815 this RN alerted by medical resident Dr Dodson that pt was found to be suddenly wheezing and tachypneic. Pt given Duoneb treatment w some improvement, MD Palomares brought to bedside. Pt moved to peds room. BiPap put on standby at the bedside. Cardiac, O2 monitoring applied. 59-year-old female past med history of anemia, diabetes, hypertension presented with nontraumatic left ankle pain for the past 3 days.  Patient noted pain associated with swelling and some redness.  Denies any pain in ambulation, fever/ chills, calf swelling, chest pain shortness of breath or any additional complaints.  No history of trauma.  Well-appearing female no acute distress exam shows mild swelling of the ankle, and dorsum of the foot with mild erythema and warmth, no lesions, full range of motion at all joints, no palpable crepitus, neurovascular intact, no calf tenderness to palpation or leg edema, normal work of  breathing, speaking full sentences, rest of exam is unremarkable.  X-ray appears negative, findings are concerning for cellulitis, antibiotics prescribed.  Patient was advised to take the antibiotic as prescribed, follow-up with primary care doctor in 3 days, strict return precautions given.  Patient verbalized understanding and is amenable with the plan.

## 2023-03-28 NOTE — PATIENT PROFILE ADULT - FALL HARM RISK TYPE OF ASSESSMENT
30 days e-cardio monitor placed.  # [id number]. Instructed patient on how to record the event and to call monitoring center at [phone] if any problems arise. Instructed patient to disconnect the lead wires from the electrodes before bathing or showering and reattach them afterwards. Instructed patient that the electrodes should be changed every 3 days or if they no longer adhere to the skin. Patient to mail package after the monitor has ended. Patient verbalized understanding.
admission

## 2024-03-22 NOTE — PROGRESS NOTE ADULT - PROBLEM SELECTOR PLAN 7
Patient w/ history of HTN but unsure of which medications she takes. As per outpatient records patient on Norvasc, HCTZ and Atenolol at home.   -Will hold for now pending additional medication reconciliation 22-Mar-2024 18:14